# Patient Record
Sex: FEMALE | Race: WHITE | Employment: OTHER | URBAN - METROPOLITAN AREA
[De-identification: names, ages, dates, MRNs, and addresses within clinical notes are randomized per-mention and may not be internally consistent; named-entity substitution may affect disease eponyms.]

---

## 2018-12-17 ENCOUNTER — APPOINTMENT (OUTPATIENT)
Dept: CT IMAGING | Age: 83
End: 2018-12-17
Payer: MEDICARE

## 2018-12-17 ENCOUNTER — HOSPITAL ENCOUNTER (EMERGENCY)
Age: 83
Discharge: HOME | End: 2018-12-17
Attending: EMERGENCY MEDICINE
Payer: MEDICARE

## 2018-12-17 VITALS
DIASTOLIC BLOOD PRESSURE: 52 MMHG | OXYGEN SATURATION: 94 % | SYSTOLIC BLOOD PRESSURE: 122 MMHG | WEIGHT: 123 LBS | BODY MASS INDEX: 21.79 KG/M2 | TEMPERATURE: 97.8 F | RESPIRATION RATE: 17 BRPM | HEART RATE: 72 BPM

## 2018-12-17 DIAGNOSIS — R42 DIZZINESS: Primary | ICD-10-CM

## 2018-12-17 LAB
ALBUMIN SERPL-MCNC: 4.4 G/DL (ref 3.5–5.2)
ALP BLD-CCNC: 40 U/L (ref 35–104)
ALT SERPL-CCNC: 16 U/L (ref 0–32)
ANION GAP SERPL CALCULATED.3IONS-SCNC: 14 MMOL/L (ref 7–16)
AST SERPL-CCNC: 15 U/L (ref 0–31)
BASOPHILS ABSOLUTE: 0.07 E9/L (ref 0–0.2)
BASOPHILS RELATIVE PERCENT: 0.6 % (ref 0–2)
BILIRUB SERPL-MCNC: 0.5 MG/DL (ref 0–1.2)
BUN BLDV-MCNC: 12 MG/DL (ref 8–23)
CALCIUM SERPL-MCNC: 10.6 MG/DL (ref 8.6–10.2)
CHLORIDE BLD-SCNC: 103 MMOL/L (ref 98–107)
CO2: 24 MMOL/L (ref 22–29)
CREAT SERPL-MCNC: 0.7 MG/DL (ref 0.5–1)
EOSINOPHILS ABSOLUTE: 0.11 E9/L (ref 0.05–0.5)
EOSINOPHILS RELATIVE PERCENT: 1 % (ref 0–6)
GFR AFRICAN AMERICAN: >60
GFR NON-AFRICAN AMERICAN: >60 ML/MIN/1.73
GLUCOSE BLD-MCNC: 132 MG/DL (ref 74–99)
HCT VFR BLD CALC: 41.3 % (ref 34–48)
HEMOGLOBIN: 13.8 G/DL (ref 11.5–15.5)
IMMATURE GRANULOCYTES #: 0.05 E9/L
IMMATURE GRANULOCYTES %: 0.4 % (ref 0–5)
LYMPHOCYTES ABSOLUTE: 2.12 E9/L (ref 1.5–4)
LYMPHOCYTES RELATIVE PERCENT: 18.5 % (ref 20–42)
MCH RBC QN AUTO: 30.3 PG (ref 26–35)
MCHC RBC AUTO-ENTMCNC: 33.4 % (ref 32–34.5)
MCV RBC AUTO: 90.6 FL (ref 80–99.9)
MONOCYTES ABSOLUTE: 0.99 E9/L (ref 0.1–0.95)
MONOCYTES RELATIVE PERCENT: 8.6 % (ref 2–12)
NEUTROPHILS ABSOLUTE: 8.11 E9/L (ref 1.8–7.3)
NEUTROPHILS RELATIVE PERCENT: 70.9 % (ref 43–80)
PDW BLD-RTO: 12.7 FL (ref 11.5–15)
PLATELET # BLD: 263 E9/L (ref 130–450)
PMV BLD AUTO: 10.6 FL (ref 7–12)
POTASSIUM SERPL-SCNC: 4.1 MMOL/L (ref 3.5–5)
RBC # BLD: 4.56 E12/L (ref 3.5–5.5)
SODIUM BLD-SCNC: 141 MMOL/L (ref 132–146)
TOTAL PROTEIN: 7.4 G/DL (ref 6.4–8.3)
TROPONIN: <0.01 NG/ML (ref 0–0.03)
WBC # BLD: 11.5 E9/L (ref 4.5–11.5)

## 2018-12-17 PROCEDURE — 99284 EMERGENCY DEPT VISIT MOD MDM: CPT

## 2018-12-17 PROCEDURE — 70450 CT HEAD/BRAIN W/O DYE: CPT

## 2018-12-17 PROCEDURE — 85025 COMPLETE CBC W/AUTO DIFF WBC: CPT

## 2018-12-17 PROCEDURE — 80053 COMPREHEN METABOLIC PANEL: CPT

## 2018-12-17 PROCEDURE — 36415 COLL VENOUS BLD VENIPUNCTURE: CPT

## 2018-12-17 PROCEDURE — 84484 ASSAY OF TROPONIN QUANT: CPT

## 2018-12-17 ASSESSMENT — ENCOUNTER SYMPTOMS
VOMITING: 0
TROUBLE SWALLOWING: 0
BACK PAIN: 0
DIARRHEA: 0
SHORTNESS OF BREATH: 0
EYE REDNESS: 0
SORE THROAT: 0
ABDOMINAL PAIN: 0
NAUSEA: 0
COUGH: 0
BLOOD IN STOOL: 0
VISUAL CHANGE: 0

## 2018-12-17 NOTE — ED NOTES
Patient ambulated with minimal assist. Patient states she feels dizzy while walking. Pulse ox 93-94%.       Ruiz Brady RN  12/17/18 6202

## 2018-12-17 NOTE — ED PROVIDER NOTES
deficit or sensory deficit. Coordination normal. GCS eye subscore is 4. GCS verbal subscore is 5. GCS motor subscore is 6. Skin: Skin is warm and dry. Nursing note and vitals reviewed. Procedures    MDM  Number of Diagnoses or Management Options  Dizziness:   Diagnosis management comments: Patient is a 80-year-old female presents to the emergency department complaining of dizziness. Patient reports she has had multiple similar episodes of this in the past, and it has now resolved. Patient is asymptomatic in the emergency department. She reports that she has prescription for Antivert at home, sometimes she has taken this for her somewhere symptoms in the past as help. However patient is not currently dizzy at all. She moves her head side-to-side with no difficulties. We'll proceed with basic labs, EKG, CT head, ambulate patient, check orthostatics, and reassess. EKG Interpretation. EKG: This EKG is signed and interpreted by me. Rate: 71  Rhythm: Sinus  Interpretation: LAD  Comparison: stable as compared to patient's most recent EKG 08/01/15    Reassess patient and discussed test results. CT evidence of a meningioma that has been present since scans evident of 2015. Patient is aware of this. Patient actually has an appointment with neurology coming up for her intermittent episodes of dizziness. Patient waited in the emergency department without any difficulties. Also provided patient with a referral to ENT. She will follow up with them on an as needed basis. Patient states she feels well and is no longer dizzy. Patient verbalized understanding and agreement to plan of care and discharge instructions.            --------------------------------------------- PAST HISTORY ---------------------------------------------  Past Medical History:  has no past medical history on file. Past Surgical History:  has no past surgical history on file.     Social History:  reports that she has never

## 2018-12-21 LAB
EKG ATRIAL RATE: 71 BPM
EKG P AXIS: 69 DEGREES
EKG P-R INTERVAL: 154 MS
EKG Q-T INTERVAL: 392 MS
EKG QRS DURATION: 76 MS
EKG QTC CALCULATION (BAZETT): 425 MS
EKG R AXIS: -34 DEGREES
EKG T AXIS: 36 DEGREES
EKG VENTRICULAR RATE: 71 BPM

## 2019-01-03 ENCOUNTER — HOSPITAL ENCOUNTER (EMERGENCY)
Age: 84
Discharge: HOME | End: 2019-01-04
Attending: EMERGENCY MEDICINE
Payer: MEDICARE

## 2019-01-03 ENCOUNTER — APPOINTMENT (OUTPATIENT)
Dept: CT IMAGING | Age: 84
End: 2019-01-03
Payer: MEDICARE

## 2019-01-03 ENCOUNTER — APPOINTMENT (OUTPATIENT)
Dept: GENERAL RADIOLOGY | Age: 84
End: 2019-01-03
Payer: MEDICARE

## 2019-01-03 VITALS
DIASTOLIC BLOOD PRESSURE: 68 MMHG | BODY MASS INDEX: 21.26 KG/M2 | WEIGHT: 120 LBS | OXYGEN SATURATION: 94 % | HEART RATE: 67 BPM | HEIGHT: 63 IN | RESPIRATION RATE: 18 BRPM | TEMPERATURE: 97.2 F | SYSTOLIC BLOOD PRESSURE: 127 MMHG

## 2019-01-03 DIAGNOSIS — D32.9 MENINGIOMA (HCC): ICD-10-CM

## 2019-01-03 DIAGNOSIS — R42 DIZZINESS: Primary | ICD-10-CM

## 2019-01-03 DIAGNOSIS — R11.0 NAUSEA: ICD-10-CM

## 2019-01-03 LAB
ALBUMIN SERPL-MCNC: 4.2 G/DL (ref 3.5–5.2)
ALP BLD-CCNC: 44 U/L (ref 35–104)
ALT SERPL-CCNC: 14 U/L (ref 0–32)
ANION GAP SERPL CALCULATED.3IONS-SCNC: 11 MMOL/L (ref 7–16)
AST SERPL-CCNC: 17 U/L (ref 0–31)
BACTERIA: NORMAL /HPF
BASOPHILS ABSOLUTE: 0.07 E9/L (ref 0–0.2)
BASOPHILS RELATIVE PERCENT: 0.6 % (ref 0–2)
BILIRUB SERPL-MCNC: 0.5 MG/DL (ref 0–1.2)
BILIRUBIN URINE: NEGATIVE
BLOOD, URINE: NEGATIVE
BUN BLDV-MCNC: 17 MG/DL (ref 8–23)
CALCIUM SERPL-MCNC: 10.6 MG/DL (ref 8.6–10.2)
CHLORIDE BLD-SCNC: 100 MMOL/L (ref 98–107)
CLARITY: CLEAR
CO2: 29 MMOL/L (ref 22–29)
COLOR: YELLOW
CREAT SERPL-MCNC: 0.7 MG/DL (ref 0.5–1)
CRYSTALS, UA: NORMAL
EKG ATRIAL RATE: 81 BPM
EKG P AXIS: 49 DEGREES
EKG P-R INTERVAL: 162 MS
EKG Q-T INTERVAL: 372 MS
EKG QRS DURATION: 76 MS
EKG QTC CALCULATION (BAZETT): 432 MS
EKG R AXIS: -30 DEGREES
EKG T AXIS: 60 DEGREES
EKG VENTRICULAR RATE: 81 BPM
EOSINOPHILS ABSOLUTE: 0.14 E9/L (ref 0.05–0.5)
EOSINOPHILS RELATIVE PERCENT: 1.2 % (ref 0–6)
GFR AFRICAN AMERICAN: >60
GFR NON-AFRICAN AMERICAN: >60 ML/MIN/1.73
GLUCOSE BLD-MCNC: 158 MG/DL (ref 74–99)
GLUCOSE URINE: NEGATIVE MG/DL
HCT VFR BLD CALC: 40.7 % (ref 34–48)
HEMOGLOBIN: 14 G/DL (ref 11.5–15.5)
IMMATURE GRANULOCYTES #: 0.08 E9/L
IMMATURE GRANULOCYTES %: 0.7 % (ref 0–5)
KETONES, URINE: ABNORMAL MG/DL
LACTIC ACID: 1.7 MMOL/L (ref 0.5–2.2)
LEUKOCYTE ESTERASE, URINE: ABNORMAL
LYMPHOCYTES ABSOLUTE: 2.6 E9/L (ref 1.5–4)
LYMPHOCYTES RELATIVE PERCENT: 22.4 % (ref 20–42)
MCH RBC QN AUTO: 30.9 PG (ref 26–35)
MCHC RBC AUTO-ENTMCNC: 34.4 % (ref 32–34.5)
MCV RBC AUTO: 89.8 FL (ref 80–99.9)
MONOCYTES ABSOLUTE: 1.2 E9/L (ref 0.1–0.95)
MONOCYTES RELATIVE PERCENT: 10.3 % (ref 2–12)
NEUTROPHILS ABSOLUTE: 7.52 E9/L (ref 1.8–7.3)
NEUTROPHILS RELATIVE PERCENT: 64.8 % (ref 43–80)
NITRITE, URINE: NEGATIVE
PDW BLD-RTO: 12.5 FL (ref 11.5–15)
PH UA: 6.5 (ref 5–9)
PLATELET # BLD: 277 E9/L (ref 130–450)
PMV BLD AUTO: 10.2 FL (ref 7–12)
POTASSIUM SERPL-SCNC: 4.3 MMOL/L (ref 3.5–5)
PROTEIN UA: NEGATIVE MG/DL
RBC # BLD: 4.53 E12/L (ref 3.5–5.5)
RBC UA: NORMAL /HPF (ref 0–2)
SODIUM BLD-SCNC: 140 MMOL/L (ref 132–146)
SPECIFIC GRAVITY UA: 1.01 (ref 1–1.03)
TOTAL PROTEIN: 7.4 G/DL (ref 6.4–8.3)
TROPONIN: <0.01 NG/ML (ref 0–0.03)
UROBILINOGEN, URINE: 0.2 E.U./DL
WBC # BLD: 11.6 E9/L (ref 4.5–11.5)
WBC UA: NORMAL /HPF (ref 0–5)

## 2019-01-03 PROCEDURE — 6360000002 HC RX W HCPCS: Performed by: EMERGENCY MEDICINE

## 2019-01-03 PROCEDURE — 84484 ASSAY OF TROPONIN QUANT: CPT

## 2019-01-03 PROCEDURE — 85025 COMPLETE CBC W/AUTO DIFF WBC: CPT

## 2019-01-03 PROCEDURE — 80053 COMPREHEN METABOLIC PANEL: CPT

## 2019-01-03 PROCEDURE — 81001 URINALYSIS AUTO W/SCOPE: CPT

## 2019-01-03 PROCEDURE — 83605 ASSAY OF LACTIC ACID: CPT

## 2019-01-03 PROCEDURE — 71046 X-RAY EXAM CHEST 2 VIEWS: CPT

## 2019-01-03 PROCEDURE — 93005 ELECTROCARDIOGRAM TRACING: CPT | Performed by: PHYSICIAN ASSISTANT

## 2019-01-03 PROCEDURE — 70450 CT HEAD/BRAIN W/O DYE: CPT

## 2019-01-03 PROCEDURE — 99284 EMERGENCY DEPT VISIT MOD MDM: CPT

## 2019-01-03 PROCEDURE — 36415 COLL VENOUS BLD VENIPUNCTURE: CPT

## 2019-01-03 RX ORDER — ONDANSETRON 2 MG/ML
4 INJECTION INTRAMUSCULAR; INTRAVENOUS ONCE
Status: DISCONTINUED | OUTPATIENT
Start: 2019-01-03 | End: 2019-01-03

## 2019-01-03 RX ORDER — ONDANSETRON 4 MG/1
4 TABLET, ORALLY DISINTEGRATING ORAL ONCE
Status: COMPLETED | OUTPATIENT
Start: 2019-01-03 | End: 2019-01-03

## 2019-01-03 RX ADMIN — ONDANSETRON 4 MG: 4 TABLET, ORALLY DISINTEGRATING ORAL at 23:48

## 2019-01-04 RX ORDER — ONDANSETRON 4 MG/1
4 TABLET, ORALLY DISINTEGRATING ORAL EVERY 8 HOURS PRN
Qty: 9 TABLET | Refills: 0 | Status: SHIPPED | OUTPATIENT
Start: 2019-01-04 | End: 2019-01-07

## 2019-05-11 ENCOUNTER — HOSPITAL ENCOUNTER (EMERGENCY)
Age: 84
Discharge: HOME | End: 2019-05-11
Attending: EMERGENCY MEDICINE
Payer: MEDICARE

## 2019-05-11 ENCOUNTER — APPOINTMENT (OUTPATIENT)
Dept: GENERAL RADIOLOGY | Age: 84
End: 2019-05-11
Payer: MEDICARE

## 2019-05-11 ENCOUNTER — APPOINTMENT (OUTPATIENT)
Dept: CT IMAGING | Age: 84
End: 2019-05-11
Payer: MEDICARE

## 2019-05-11 VITALS
HEART RATE: 96 BPM | HEIGHT: 63 IN | DIASTOLIC BLOOD PRESSURE: 75 MMHG | OXYGEN SATURATION: 96 % | RESPIRATION RATE: 20 BRPM | TEMPERATURE: 97.1 F | WEIGHT: 121 LBS | SYSTOLIC BLOOD PRESSURE: 149 MMHG | BODY MASS INDEX: 21.44 KG/M2

## 2019-05-11 DIAGNOSIS — F41.1 ANXIETY STATE: Primary | ICD-10-CM

## 2019-05-11 LAB
ALBUMIN SERPL-MCNC: 4.4 G/DL (ref 3.5–5.2)
ALP BLD-CCNC: 49 U/L (ref 35–104)
ALT SERPL-CCNC: 17 U/L (ref 0–32)
ANION GAP SERPL CALCULATED.3IONS-SCNC: 11 MMOL/L (ref 7–16)
AST SERPL-CCNC: 19 U/L (ref 0–31)
BETA-HYDROXYBUTYRATE: 0.18 MMOL/L (ref 0.02–0.27)
BILIRUB SERPL-MCNC: 0.6 MG/DL (ref 0–1.2)
BILIRUBIN URINE: NEGATIVE
BLOOD, URINE: NEGATIVE
BUN BLDV-MCNC: 8 MG/DL (ref 8–23)
CALCIUM SERPL-MCNC: 9.6 MG/DL (ref 8.6–10.2)
CHLORIDE BLD-SCNC: 104 MMOL/L (ref 98–107)
CLARITY: CLEAR
CO2: 26 MMOL/L (ref 22–29)
COLOR: YELLOW
CREAT SERPL-MCNC: 0.6 MG/DL (ref 0.5–1)
GFR AFRICAN AMERICAN: >60
GFR NON-AFRICAN AMERICAN: >60 ML/MIN/1.73
GLUCOSE BLD-MCNC: 157 MG/DL (ref 74–99)
GLUCOSE URINE: NEGATIVE MG/DL
HCT VFR BLD CALC: 38.1 % (ref 34–48)
HEMOGLOBIN: 13.4 G/DL (ref 11.5–15.5)
KETONES, URINE: NEGATIVE MG/DL
LACTIC ACID: 3.1 MMOL/L (ref 0.5–2.2)
LEUKOCYTE ESTERASE, URINE: NEGATIVE
MCH RBC QN AUTO: 30.9 PG (ref 26–35)
MCHC RBC AUTO-ENTMCNC: 35.2 % (ref 32–34.5)
MCV RBC AUTO: 87.8 FL (ref 80–99.9)
METER GLUCOSE: 246 MG/DL (ref 74–99)
NITRITE, URINE: NEGATIVE
PDW BLD-RTO: 14 FL (ref 11.5–15)
PH UA: 5.5 (ref 5–9)
PH VENOUS: 7.37 (ref 7.3–7.42)
PLATELET # BLD: 279 E9/L (ref 130–450)
PMV BLD AUTO: 10.8 FL (ref 7–12)
POTASSIUM SERPL-SCNC: 3.7 MMOL/L (ref 3.5–5)
PROTEIN UA: NEGATIVE MG/DL
RBC # BLD: 4.34 E12/L (ref 3.5–5.5)
REASON FOR REJECTION: NORMAL
REJECTED TEST: NORMAL
SODIUM BLD-SCNC: 141 MMOL/L (ref 132–146)
SPECIFIC GRAVITY UA: <=1.005 (ref 1–1.03)
TOTAL PROTEIN: 7.3 G/DL (ref 6.4–8.3)
TROPONIN: <0.01 NG/ML (ref 0–0.03)
TSH SERPL DL<=0.05 MIU/L-ACNC: 2.66 UIU/ML (ref 0.27–4.2)
UROBILINOGEN, URINE: 0.2 E.U./DL
WBC # BLD: 11.4 E9/L (ref 4.5–11.5)

## 2019-05-11 PROCEDURE — 99284 EMERGENCY DEPT VISIT MOD MDM: CPT

## 2019-05-11 PROCEDURE — 81003 URINALYSIS AUTO W/O SCOPE: CPT

## 2019-05-11 PROCEDURE — 84484 ASSAY OF TROPONIN QUANT: CPT

## 2019-05-11 PROCEDURE — 83605 ASSAY OF LACTIC ACID: CPT

## 2019-05-11 PROCEDURE — 82800 BLOOD PH: CPT

## 2019-05-11 PROCEDURE — 82010 KETONE BODYS QUAN: CPT

## 2019-05-11 PROCEDURE — 93005 ELECTROCARDIOGRAM TRACING: CPT | Performed by: EMERGENCY MEDICINE

## 2019-05-11 PROCEDURE — 70450 CT HEAD/BRAIN W/O DYE: CPT

## 2019-05-11 PROCEDURE — 84443 ASSAY THYROID STIM HORMONE: CPT

## 2019-05-11 PROCEDURE — 36415 COLL VENOUS BLD VENIPUNCTURE: CPT

## 2019-05-11 PROCEDURE — 80053 COMPREHEN METABOLIC PANEL: CPT

## 2019-05-11 PROCEDURE — 2580000003 HC RX 258: Performed by: NURSE PRACTITIONER

## 2019-05-11 PROCEDURE — 96374 THER/PROPH/DIAG INJ IV PUSH: CPT

## 2019-05-11 PROCEDURE — 6360000002 HC RX W HCPCS: Performed by: NURSE PRACTITIONER

## 2019-05-11 PROCEDURE — 82962 GLUCOSE BLOOD TEST: CPT

## 2019-05-11 PROCEDURE — 85027 COMPLETE CBC AUTOMATED: CPT

## 2019-05-11 PROCEDURE — 71046 X-RAY EXAM CHEST 2 VIEWS: CPT

## 2019-05-11 RX ORDER — CALCIUM POLYCARBOPHIL 625 MG 625 MG/1
625 TABLET ORAL DAILY
COMMUNITY

## 2019-05-11 RX ORDER — ESCITALOPRAM OXALATE 20 MG/1
20 TABLET ORAL DAILY
Refills: 1 | Status: ON HOLD | COMMUNITY
Start: 2019-05-09 | End: 2020-04-27 | Stop reason: HOSPADM

## 2019-05-11 RX ORDER — VILAZODONE HYDROCHLORIDE 40 MG/1
40 TABLET ORAL DAILY
Refills: 0 | Status: ON HOLD | COMMUNITY
Start: 2019-05-07 | End: 2020-04-25

## 2019-05-11 RX ORDER — BUSPIRONE HYDROCHLORIDE 10 MG/1
10 TABLET ORAL DAILY
Refills: 0 | Status: ON HOLD | COMMUNITY
Start: 2019-04-18 | End: 2020-04-25

## 2019-05-11 RX ORDER — MULTIVIT-MIN/FA/LYCOPEN/LUTEIN .4-300-25
1 TABLET ORAL DAILY
Status: ON HOLD | COMMUNITY
End: 2020-12-28

## 2019-05-11 RX ORDER — MIRTAZAPINE 15 MG/1
15 TABLET, FILM COATED ORAL DAILY
Refills: 0 | Status: ON HOLD | COMMUNITY
Start: 2019-04-28 | End: 2020-04-27 | Stop reason: HOSPADM

## 2019-05-11 RX ORDER — 0.9 % SODIUM CHLORIDE 0.9 %
500 INTRAVENOUS SOLUTION INTRAVENOUS ONCE
Status: COMPLETED | OUTPATIENT
Start: 2019-05-11 | End: 2019-05-11

## 2019-05-11 RX ORDER — LORAZEPAM 2 MG/ML
0.5 INJECTION INTRAMUSCULAR ONCE
Status: COMPLETED | OUTPATIENT
Start: 2019-05-11 | End: 2019-05-11

## 2019-05-11 RX ADMIN — SODIUM CHLORIDE 500 ML: 9 INJECTION, SOLUTION INTRAVENOUS at 11:42

## 2019-05-11 RX ADMIN — LORAZEPAM 0.5 MG: 2 INJECTION INTRAMUSCULAR; INTRAVENOUS at 11:43

## 2019-05-11 NOTE — ED PROVIDER NOTES
ED Attending  CC: Zuleyka         Department of Emergency Medicine   ED  Provider Note  Admit Date/RoomTime: 5/11/2019 11:12 AM  ED Room: 01/01  MRN: [id number]  Chief Complaint: Anxiety (states after she took her Buspar which is not new \"my skin feels like its jumping off\")       History of Present Illness   Source of history provided by:  patient. History/Exam Limitations: mental status. Indigo Cerrato is a 80 y.o. female who has a past medical history of:   Past Medical History:   Diagnosis Date    Diabetes mellitus ([address]     Hyperlipidemia     Hypertension     Thyroid disease     presents to the ED by EMS for complaint of possible anxiety. She reports that she feels like her skin is jumping off her body. She states that she has this occurrence often after taking her Buspar. She is somewhat of a poor historian when questioning her visits with her doctor and medications. She reports that she lives in her own home but has care givers who assist her. Denies chest pain, shortness of breath, palpitations, abdominal pain, nausea, vomiting, diarrhea, headache, dizziness, lightheadedness, syncope, extremity weakness, calf pain, dysuria, or urinary frequency. ROS    Pertinent positives and negatives are stated within HPI, all other systems reviewed and are negative. Past Surgical History:   Procedure Laterality Date    DILATION AND CURETTAGE OF UTERUS      THYROIDECTOMY     Social History:  reports that she has never smoked. She has never used smokeless tobacco. She reports that she does not drink alcohol or use drugs. Family History: family history is not on file. Allergies: Patient has no known allergies.     Physical Exam   Oxygen Saturation Interpretation: Normal.   ED Triage Vitals [05/11/19 1112]   BP Temp Temp Source Pulse Resp SpO2 Height Weight   (!) 148/81 97.1 °F (36.2 °C) Temporal 99 16 96 % 5' 3\" (1.6 m) 121 lb (54.9 kg)     Physical Exam  · Constitutional/General: Alert and oriented x2, well appearing, non toxic  · HEENT:  NC/NT. PERRLA,  Airway patent. · Neck: Supple, full ROM, non tender to palpation in the midline, no stridor, no crepitus, no meningeal signs  · Respiratory: Lungs clear to auscultation bilaterally, no wheezes, rales, or rhonchi. Not in respiratory distress  · CV:  Regular rate. Regular rhythm. + systolic murmur. 2+ distal pulses  · Chest: No chest wall tenderness  · GI:  Abdomen Soft, Non tender, Non distended. +BS. No rebound, guarding, or rigidity. No pulsatile masses. · Musculoskeletal: Moves all extremities x 4. Warm and well perfused, no clubbing, cyanosis, or edema. Capillary refill <3 seconds  · Integument: skin warm and dry. No rashes.    · Lymphatic: no lymphadenopathy noted  · Neurologic: GCS 14, no focal deficits, symmetric strength 5/5 in the upper and lower extremities bilaterally  · Psychiatric: Normal Affect    Lab / Imaging Results   (All laboratory and radiology results have been personally reviewed by myself)  Labs:  Results for orders placed or performed during the hospital encounter of 05/11/19   CBC   Result Value Ref Range    WBC 11.4 4.5 - 11.5 E9/L    RBC 4.34 3.50 - 5.50 E12/L    Hemoglobin 13.4 11.5 - 15.5 g/dL    Hematocrit 38.1 34.0 - 48.0 %    MCV 87.8 80.0 - 99.9 fL    MCH 30.9 26.0 - 35.0 pg    MCHC 35.2 (H) 32.0 - 34.5 %    RDW 14.0 11.5 - 15.0 fL    Platelets 839 907 - 234 E9/L    MPV 10.8 7.0 - 12.0 fL   TSH without Reflex   Result Value Ref Range    TSH 2.660 0.270 - 4.200 uIU/mL   Urinalysis   Result Value Ref Range    Color, UA Yellow Straw/Yellow    Clarity, UA Clear Clear    Glucose, Ur Negative Negative mg/dL    Bilirubin Urine Negative Negative    Ketones, Urine Negative Negative mg/dL    Specific Gravity, UA <=1.005 1.005 - 1.030    Blood, Urine Negative Negative    pH, UA 5.5 5.0 - 9.0    Protein, UA Negative Negative mg/dL    Urobilinogen, Urine 0.2 <2.0 E.U./dL    Nitrite, Urine Negative Negative    Leukocyte Esterase, Urine Negative Negative   Lactic Acid, Plasma   Result Value Ref Range    Lactic Acid 3.1 (H) 0.5 - 2.2 mmol/L   pH, venous   Result Value Ref Range    pH, Jacinto 7.37 7.30 - 7.42   Beta-Hydroxybutyrate   Result Value Ref Range    Beta-Hydroxybutyrate 0.18 0.02 - 0.27 mmol/L   SPECIMEN REJECTION   Result Value Ref Range    Rejected Test CMP     Reason for Rejection see below    Comprehensive Metabolic Panel   Result Value Ref Range    Sodium 141 132 - 146 mmol/L    Potassium 3.7 3.5 - 5.0 mmol/L    Chloride 104 98 - 107 mmol/L    CO2 26 22 - 29 mmol/L    Anion Gap 11 7 - 16 mmol/L    Glucose 157 (H) 74 - 99 mg/dL    BUN 8 8 - 23 mg/dL    CREATININE 0.6 0.5 - 1.0 mg/dL    GFR Non-African American >60 >=60 mL/min/1.73    GFR African American >60     Calcium 9.6 8.6 - 10.2 mg/dL    Total Protein 7.3 6.4 - 8.3 g/dL    Alb 4.4 3.5 - 5.2 g/dL    Total Bilirubin 0.6 0.0 - 1.2 mg/dL    Alkaline Phosphatase 49 35 - 104 U/L    ALT 17 0 - 32 U/L    AST 19 0 - 31 U/L   Troponin   Result Value Ref Range    Troponin <0.01 0.00 - 0.03 ng/mL   POCT Glucose   Result Value Ref Range    Meter Glucose 246 (H) 74 - 99 mg/dL   EKG 12 Lead   Result Value Ref Range    Ventricular Rate 91 BPM    Atrial Rate 91 BPM    P-R Interval 162 ms    QRS Duration 74 ms    Q-T Interval 364 ms    QTc Calculation (Bazett) 447 ms    P Axis 66 degrees    R Axis -18 degrees    T Axis 71 degrees     Imaging: All Radiology results interpreted by Radiologist unless otherwise noted. XR CHEST STANDARD (2 VW)   Final Result   Indeterminate right basilar airspace disease could be   reflective of atelectasis/scarring and/or an early developing   infiltrate/pneumonia. CT Head WO Contrast   Final Result   1. No CT evidence of acute intracranial abnormality, as questioned.  If   there is clinical concern for potential underlying acute   cerebrovascular infarction/accident or other potential abnormalities   of underlying brain parenchyma, MRI of the brain would be recommended for more detailed evaluation. .   2. Mild to moderate cerebral volume loss/atrophy with white matter   changes suggestive of sequelae small vessel ischemic disease. 3. Vascular calcifications within the bilateral internal carotid   artery cavernous segments. .   4. Partially empty sella. ED Course / Medical Decision Making     Medications   0.9 % sodium chloride bolus (0 mLs Intravenous Stopped 5/11/19 1310)   LORazepam (ATIVAN) injection 0.5 mg (0.5 mg Intravenous Given 5/11/19 1143)         EKG #1:  Interpreted by emergency department physician unless otherwise noted. Time:  1118    Rate: 91  Rhythm: Sinus. Interpretation: normal sinus rhythm, PAC's noted. EKG reviewed with Dr Jaiden Cervantes  Re-examination:  5/11/19       Time: 1230 Pt reports significant improvement       Consult(s):   None    Procedure(s):   none    MDM:   Kristen Caceres is a 24-year-old female who presents to the emergency department via EMS for complaint of tingling like her skin is jumping off her body. She states that she has had similar symptoms in the past and she associates it with taking her BuSpar. She reports being under the care of Dr Merle Lynne for psychiatry. She is oriented to person, place, but confused on time factor. CBC no leukocytosis with a stable H&H of 13.4/38.1 TSH 2.66, beta hydroxybutyrate 0.18, CT of the head unremarkable for any acute process. CXR negative. Troponin < 0.01. Lactic acid 3.1 and no concerns for infectious process. She was given IV fluids while in the ED in addition to Ativan. Results were discussed with the pt and sister and instructed to follow up with Dr Thomas Leon and Dr Merle Lynne for medication adjustments. Advised to return to the ED for any new, changing, worsening symptoms or concerns. Counseling:     The emergency provider has spoken with the patient and and sister and discussed todays results, in addition to providing specific details for the plan of care and counseling regarding the diagnosis and prognosis. Questions are answered at this time and they are agreeable with the plan. Assessment      1. Anxiety state      Plan   Discharge to home  Patient condition is good    New Medications     Discharge Medication List as of 5/11/2019  3:11 PM        Electronically signed by TRISH Jain CNP   DD: 5/11/19  **This report was transcribed using voice recognition software. Every effort was made to ensure accuracy; however, inadvertent computerized transcription errors may be present.   END OF ED PROVIDER NOTE     TRISH Jain CNP  05/12/19 2171

## 2019-05-12 ENCOUNTER — HOSPITAL ENCOUNTER (EMERGENCY)
Age: 84
Discharge: HOME | End: 2019-05-12
Attending: EMERGENCY MEDICINE
Payer: MEDICARE

## 2019-05-12 VITALS
SYSTOLIC BLOOD PRESSURE: 140 MMHG | BODY MASS INDEX: 21.44 KG/M2 | HEART RATE: 75 BPM | OXYGEN SATURATION: 98 % | TEMPERATURE: 97.3 F | DIASTOLIC BLOOD PRESSURE: 62 MMHG | HEIGHT: 63 IN | WEIGHT: 121 LBS | RESPIRATION RATE: 16 BRPM

## 2019-05-12 DIAGNOSIS — F41.9 ANXIETY: Primary | ICD-10-CM

## 2019-05-12 LAB
ACETAMINOPHEN LEVEL: <5 MCG/ML (ref 10–30)
AMPHETAMINE SCREEN, URINE: NOT DETECTED
BARBITURATE SCREEN URINE: NOT DETECTED
BENZODIAZEPINE SCREEN, URINE: NOT DETECTED
CANNABINOID SCREEN URINE: NOT DETECTED
COCAINE METABOLITE SCREEN URINE: NOT DETECTED
EKG ATRIAL RATE: 91 BPM
EKG P AXIS: 66 DEGREES
EKG P-R INTERVAL: 162 MS
EKG Q-T INTERVAL: 364 MS
EKG QRS DURATION: 74 MS
EKG QTC CALCULATION (BAZETT): 447 MS
EKG R AXIS: -18 DEGREES
EKG T AXIS: 71 DEGREES
EKG VENTRICULAR RATE: 91 BPM
ETHANOL: <10 MG/DL (ref 0–0.08)
METHADONE SCREEN, URINE: NOT DETECTED
OPIATE SCREEN URINE: NOT DETECTED
PHENCYCLIDINE SCREEN URINE: NOT DETECTED
PROPOXYPHENE SCREEN: NOT DETECTED
SALICYLATE, SERUM: <0.3 MG/DL (ref 0–30)
TRICYCLIC ANTIDEPRESSANTS SCREEN SERUM: NEGATIVE NG/ML

## 2019-05-12 PROCEDURE — 93010 ELECTROCARDIOGRAM REPORT: CPT | Performed by: INTERNAL MEDICINE

## 2019-05-12 PROCEDURE — 80307 DRUG TEST PRSMV CHEM ANLYZR: CPT

## 2019-05-12 PROCEDURE — G0480 DRUG TEST DEF 1-7 CLASSES: HCPCS

## 2019-05-12 PROCEDURE — 36415 COLL VENOUS BLD VENIPUNCTURE: CPT

## 2019-05-12 PROCEDURE — 99284 EMERGENCY DEPT VISIT MOD MDM: CPT

## 2019-05-12 NOTE — ED NOTES
The pt is a 80year old white female who was brought in by EMS- accompanied by her sister due to anxiety where the cl stated that her skin was crawling and thoughts were racing. She reportedly saw her doctor - Dr. Merle Lynne several weeks ago and he took her off of remeron and put her on Vibrid after she had a DNA test.  The sister stated that things have been ok until this week. She stated that several months ago she was abusing klonopin and she was taken off of it. Yesterday when she came to the ER with anxiety she was given a dose of ativan and sent with a script. She stated that she took one ativan yesterday and did fine. Today she took one early AM and the afternoon and did not get better so she called 911. The sister denies any recent stressors. The pt denies past or present SI, HI, AVH, AOD, and in pt psych stays. The pt was ok with the plan to call Dr. Qing Solano service to see what her doctor would like to do. The ER doctor is considering Vistiril- however the pt's sister reported that the pt had a bad reaction to benadryl in the past where she slept a lot. Pt completed the SBIRT and CSSRS.      [facility]  05/12/19 0318

## 2019-05-12 NOTE — ED NOTES
Bed: 17A-17  Expected date:   Expected time:   Means of arrival:   Comments:  obinna Oseguera RN  05/12/19 3377

## 2019-05-12 NOTE — ED NOTES
No change in pts condition, sitting upright in bed talking with caregiver at bed side, no distress noted, no complaints offered, awaiting SW to evaluate      Rolly Romero RN  05/12/19 3443

## 2019-05-15 NOTE — ED PROVIDER NOTES
Course / Medical Decision Making   Medications - No data to display  Consult(s):   IP CONSULT TO SOCIAL WORK    Procedure(s):   none    MDM:   Patient in no acute distress. Vital signs stable. Dr. Quiana Lawson evaluated the patient. Social work consulted. Results from yesterday were reviewed. Drug screens added. Patient will follow with PCP and psychiatrist. Educated on the signs and symptoms to return to the ED. Discharged in stable condition. Counseling: The emergency provider has spoken with the patient and discussed todays results, in addition to providing specific details for the plan of care and counseling regarding the diagnosis and prognosis. Questions are answered at this time and they are agreeable with the plan. Assessment      1. Anxiety      Plan   Discharge to home  Patient condition is good    New Medications     Discharge Medication List as of 5/12/2019  4:37 PM        Electronically signed by MOSES Stauffer   DD: 5/15/19  **This report was transcribed using voice recognition software. Every effort was made to ensure accuracy; however, inadvertent computerized transcription errors may be present.   END OF ED PROVIDER NOTE       Shaneka Stauffer  05/15/19 9178

## 2020-04-25 ENCOUNTER — APPOINTMENT (OUTPATIENT)
Dept: CT IMAGING | Age: 85
DRG: 690 | End: 2020-04-25
Payer: MEDICARE

## 2020-04-25 ENCOUNTER — HOSPITAL ENCOUNTER (INPATIENT)
Age: 85
LOS: 2 days | Discharge: HOME HEALTH | DRG: 690 | End: 2020-04-27
Attending: EMERGENCY MEDICINE | Admitting: INTERNAL MEDICINE
Payer: MEDICARE

## 2020-04-25 PROBLEM — N39.0 COMPLICATED UTI (URINARY TRACT INFECTION): Status: ACTIVE | Noted: 2020-04-25

## 2020-04-25 PROBLEM — N39.0 UTI (URINARY TRACT INFECTION): Status: ACTIVE | Noted: 2020-04-25

## 2020-04-25 LAB
ALBUMIN SERPL-MCNC: 4.7 G/DL (ref 3.5–5.2)
ALP BLD-CCNC: 48 U/L (ref 35–104)
ALT SERPL-CCNC: 31 U/L (ref 0–32)
ANION GAP SERPL CALCULATED.3IONS-SCNC: 19 MMOL/L (ref 7–16)
AST SERPL-CCNC: 29 U/L (ref 0–31)
BACTERIA: ABNORMAL /HPF
BASOPHILS ABSOLUTE: 0.07 E9/L (ref 0–0.2)
BASOPHILS RELATIVE PERCENT: 0.4 % (ref 0–2)
BILIRUB SERPL-MCNC: 0.5 MG/DL (ref 0–1.2)
BILIRUBIN URINE: NEGATIVE
BLOOD, URINE: ABNORMAL
BUN BLDV-MCNC: 11 MG/DL (ref 8–23)
BURR CELLS: ABNORMAL
CALCIUM SERPL-MCNC: 10.9 MG/DL (ref 8.6–10.2)
CHLORIDE BLD-SCNC: 99 MMOL/L (ref 98–107)
CLARITY: CLEAR
CO2: 22 MMOL/L (ref 22–29)
COLOR: YELLOW
CREAT SERPL-MCNC: 0.7 MG/DL (ref 0.5–1)
EOSINOPHILS ABSOLUTE: 0.07 E9/L (ref 0.05–0.5)
EOSINOPHILS RELATIVE PERCENT: 0.4 % (ref 0–6)
GFR AFRICAN AMERICAN: >60
GFR NON-AFRICAN AMERICAN: >60 ML/MIN/1.73
GLUCOSE BLD-MCNC: 252 MG/DL (ref 74–99)
GLUCOSE URINE: 500 MG/DL
HBA1C MFR BLD: 8 % (ref 4–5.6)
HCT VFR BLD CALC: 41.6 % (ref 34–48)
HEMOGLOBIN: 13.7 G/DL (ref 11.5–15.5)
IMMATURE GRANULOCYTES #: 0.13 E9/L
IMMATURE GRANULOCYTES %: 0.7 % (ref 0–5)
KETONES, URINE: NEGATIVE MG/DL
LACTIC ACID: 4.1 MMOL/L (ref 0.5–2.2)
LEUKOCYTE ESTERASE, URINE: ABNORMAL
LYMPHOCYTES ABSOLUTE: 2.68 E9/L (ref 1.5–4)
LYMPHOCYTES RELATIVE PERCENT: 13.6 % (ref 20–42)
MCH RBC QN AUTO: 29.2 PG (ref 26–35)
MCHC RBC AUTO-ENTMCNC: 32.9 % (ref 32–34.5)
MCV RBC AUTO: 88.7 FL (ref 80–99.9)
METER GLUCOSE: 229 MG/DL (ref 74–99)
MONOCYTES ABSOLUTE: 1.54 E9/L (ref 0.1–0.95)
MONOCYTES RELATIVE PERCENT: 7.8 % (ref 2–12)
NEUTROPHILS ABSOLUTE: 15.19 E9/L (ref 1.8–7.3)
NEUTROPHILS RELATIVE PERCENT: 77.1 % (ref 43–80)
NITRITE, URINE: NEGATIVE
PDW BLD-RTO: 13.4 FL (ref 11.5–15)
PH UA: 6 (ref 5–9)
PLATELET # BLD: 319 E9/L (ref 130–450)
PMV BLD AUTO: 11 FL (ref 7–12)
POIKILOCYTES: ABNORMAL
POTASSIUM REFLEX MAGNESIUM: 4.4 MMOL/L (ref 3.5–5)
PROTEIN UA: NEGATIVE MG/DL
RBC # BLD: 4.69 E12/L (ref 3.5–5.5)
RBC UA: ABNORMAL /HPF (ref 0–2)
SODIUM BLD-SCNC: 140 MMOL/L (ref 132–146)
SPECIFIC GRAVITY UA: 1.02 (ref 1–1.03)
TOTAL PROTEIN: 7.8 G/DL (ref 6.4–8.3)
UROBILINOGEN, URINE: 0.2 E.U./DL
WBC # BLD: 19.7 E9/L (ref 4.5–11.5)
WBC UA: ABNORMAL /HPF (ref 0–5)

## 2020-04-25 PROCEDURE — 1200000000 HC SEMI PRIVATE

## 2020-04-25 PROCEDURE — 80053 COMPREHEN METABOLIC PANEL: CPT

## 2020-04-25 PROCEDURE — 83036 HEMOGLOBIN GLYCOSYLATED A1C: CPT

## 2020-04-25 PROCEDURE — 83605 ASSAY OF LACTIC ACID: CPT

## 2020-04-25 PROCEDURE — 96365 THER/PROPH/DIAG IV INF INIT: CPT

## 2020-04-25 PROCEDURE — 99285 EMERGENCY DEPT VISIT HI MDM: CPT

## 2020-04-25 PROCEDURE — 81001 URINALYSIS AUTO W/SCOPE: CPT

## 2020-04-25 PROCEDURE — 82962 GLUCOSE BLOOD TEST: CPT

## 2020-04-25 PROCEDURE — 74177 CT ABD & PELVIS W/CONTRAST: CPT

## 2020-04-25 PROCEDURE — 36415 COLL VENOUS BLD VENIPUNCTURE: CPT

## 2020-04-25 PROCEDURE — 6360000004 HC RX CONTRAST MEDICATION: Performed by: RADIOLOGY

## 2020-04-25 PROCEDURE — 87040 BLOOD CULTURE FOR BACTERIA: CPT

## 2020-04-25 PROCEDURE — 85025 COMPLETE CBC W/AUTO DIFF WBC: CPT

## 2020-04-25 PROCEDURE — 87088 URINE BACTERIA CULTURE: CPT

## 2020-04-25 PROCEDURE — 2580000003 HC RX 258: Performed by: INTERNAL MEDICINE

## 2020-04-25 PROCEDURE — 6360000002 HC RX W HCPCS: Performed by: NURSE PRACTITIONER

## 2020-04-25 PROCEDURE — G0378 HOSPITAL OBSERVATION PER HR: HCPCS

## 2020-04-25 PROCEDURE — 6360000002 HC RX W HCPCS: Performed by: INTERNAL MEDICINE

## 2020-04-25 PROCEDURE — 96366 THER/PROPH/DIAG IV INF ADDON: CPT

## 2020-04-25 PROCEDURE — 2580000003 HC RX 258: Performed by: NURSE PRACTITIONER

## 2020-04-25 RX ORDER — NICOTINE POLACRILEX 4 MG
15 LOZENGE BUCCAL PRN
Status: DISCONTINUED | OUTPATIENT
Start: 2020-04-25 | End: 2020-04-27 | Stop reason: HOSPADM

## 2020-04-25 RX ORDER — ASPIRIN 81 MG/1
81 TABLET ORAL DAILY
Status: DISCONTINUED | OUTPATIENT
Start: 2020-04-26 | End: 2020-04-27 | Stop reason: HOSPADM

## 2020-04-25 RX ORDER — ATORVASTATIN CALCIUM 20 MG/1
20 TABLET, FILM COATED ORAL ONCE
Status: DISCONTINUED | OUTPATIENT
Start: 2020-04-25 | End: 2020-04-27 | Stop reason: HOSPADM

## 2020-04-25 RX ORDER — 0.9 % SODIUM CHLORIDE 0.9 %
1000 INTRAVENOUS SOLUTION INTRAVENOUS ONCE
Status: COMPLETED | OUTPATIENT
Start: 2020-04-25 | End: 2020-04-25

## 2020-04-25 RX ORDER — SODIUM CHLORIDE 0.9 % (FLUSH) 0.9 %
10 SYRINGE (ML) INJECTION PRN
Status: DISCONTINUED | OUTPATIENT
Start: 2020-04-25 | End: 2020-04-27 | Stop reason: HOSPADM

## 2020-04-25 RX ORDER — ALOGLIPTIN 12.5 MG/1
12.5 TABLET, FILM COATED ORAL DAILY
Status: DISCONTINUED | OUTPATIENT
Start: 2020-04-26 | End: 2020-04-27 | Stop reason: HOSPADM

## 2020-04-25 RX ORDER — ESCITALOPRAM OXALATE 10 MG/1
20 TABLET ORAL DAILY
Status: DISCONTINUED | OUTPATIENT
Start: 2020-04-26 | End: 2020-04-27 | Stop reason: HOSPADM

## 2020-04-25 RX ORDER — CALCIUM POLYCARBOPHIL 625 MG 625 MG/1
625 TABLET ORAL DAILY
Status: DISCONTINUED | OUTPATIENT
Start: 2020-04-26 | End: 2020-04-27 | Stop reason: HOSPADM

## 2020-04-25 RX ORDER — ONDANSETRON 2 MG/ML
4 INJECTION INTRAMUSCULAR; INTRAVENOUS EVERY 6 HOURS PRN
Status: DISCONTINUED | OUTPATIENT
Start: 2020-04-25 | End: 2020-04-27 | Stop reason: HOSPADM

## 2020-04-25 RX ORDER — ACETAMINOPHEN 325 MG/1
650 TABLET ORAL EVERY 6 HOURS PRN
Status: DISCONTINUED | OUTPATIENT
Start: 2020-04-25 | End: 2020-04-27 | Stop reason: HOSPADM

## 2020-04-25 RX ORDER — LEVOTHYROXINE SODIUM 0.03 MG/1
25 TABLET ORAL DAILY
Status: DISCONTINUED | OUTPATIENT
Start: 2020-04-26 | End: 2020-04-27 | Stop reason: HOSPADM

## 2020-04-25 RX ORDER — VILAZODONE HYDROCHLORIDE 40 MG/1
40 TABLET ORAL DAILY
Status: DISCONTINUED | OUTPATIENT
Start: 2020-04-26 | End: 2020-04-26

## 2020-04-25 RX ORDER — UBIDECARENONE 75 MG
100 CAPSULE ORAL DAILY
Status: DISCONTINUED | OUTPATIENT
Start: 2020-04-26 | End: 2020-04-27 | Stop reason: HOSPADM

## 2020-04-25 RX ORDER — MORPHINE SULFATE 2 MG/ML
2 INJECTION, SOLUTION INTRAMUSCULAR; INTRAVENOUS ONCE
Status: COMPLETED | OUTPATIENT
Start: 2020-04-25 | End: 2020-04-25

## 2020-04-25 RX ORDER — ACETAMINOPHEN 650 MG/1
650 SUPPOSITORY RECTAL EVERY 6 HOURS PRN
Status: DISCONTINUED | OUTPATIENT
Start: 2020-04-25 | End: 2020-04-27 | Stop reason: HOSPADM

## 2020-04-25 RX ORDER — DEXTROSE MONOHYDRATE 25 G/50ML
12.5 INJECTION, SOLUTION INTRAVENOUS PRN
Status: DISCONTINUED | OUTPATIENT
Start: 2020-04-25 | End: 2020-04-27 | Stop reason: HOSPADM

## 2020-04-25 RX ORDER — PROMETHAZINE HYDROCHLORIDE 25 MG/1
12.5 TABLET ORAL EVERY 6 HOURS PRN
Status: DISCONTINUED | OUTPATIENT
Start: 2020-04-25 | End: 2020-04-27 | Stop reason: HOSPADM

## 2020-04-25 RX ORDER — MIRTAZAPINE 15 MG/1
15 TABLET, FILM COATED ORAL DAILY
Status: DISCONTINUED | OUTPATIENT
Start: 2020-04-26 | End: 2020-04-27 | Stop reason: HOSPADM

## 2020-04-25 RX ORDER — DEXTROSE MONOHYDRATE 50 MG/ML
100 INJECTION, SOLUTION INTRAVENOUS PRN
Status: DISCONTINUED | OUTPATIENT
Start: 2020-04-25 | End: 2020-04-27 | Stop reason: HOSPADM

## 2020-04-25 RX ORDER — BUSPIRONE HYDROCHLORIDE 10 MG/1
10 TABLET ORAL DAILY
Status: DISCONTINUED | OUTPATIENT
Start: 2020-04-26 | End: 2020-04-25

## 2020-04-25 RX ORDER — POLYETHYLENE GLYCOL 3350 17 G/17G
17 POWDER, FOR SOLUTION ORAL DAILY PRN
Status: DISCONTINUED | OUTPATIENT
Start: 2020-04-25 | End: 2020-04-27 | Stop reason: HOSPADM

## 2020-04-25 RX ORDER — AMLODIPINE BESYLATE 10 MG/1
10 TABLET ORAL DAILY
Status: DISCONTINUED | OUTPATIENT
Start: 2020-04-26 | End: 2020-04-27 | Stop reason: HOSPADM

## 2020-04-25 RX ORDER — SODIUM CHLORIDE 0.9 % (FLUSH) 0.9 %
10 SYRINGE (ML) INJECTION EVERY 12 HOURS SCHEDULED
Status: DISCONTINUED | OUTPATIENT
Start: 2020-04-25 | End: 2020-04-27 | Stop reason: HOSPADM

## 2020-04-25 RX ADMIN — IOPAMIDOL 110 ML: 755 INJECTION, SOLUTION INTRAVENOUS at 19:34

## 2020-04-25 RX ADMIN — CEFTRIAXONE SODIUM 1 G: 1 INJECTION, POWDER, FOR SOLUTION INTRAMUSCULAR; INTRAVENOUS at 23:32

## 2020-04-25 RX ADMIN — SODIUM CHLORIDE 1000 ML: 9 INJECTION, SOLUTION INTRAVENOUS at 17:56

## 2020-04-25 RX ADMIN — Medication 10 ML: at 23:35

## 2020-04-25 RX ADMIN — CEFTRIAXONE SODIUM 1 G: 1 INJECTION, POWDER, FOR SOLUTION INTRAMUSCULAR; INTRAVENOUS at 18:12

## 2020-04-25 RX ADMIN — Medication 2 MG: at 20:27

## 2020-04-25 ASSESSMENT — PAIN SCALES - GENERAL
PAINLEVEL_OUTOF10: 8
PAINLEVEL_OUTOF10: 4
PAINLEVEL_OUTOF10: 0
PAINLEVEL_OUTOF10: 10

## 2020-04-25 ASSESSMENT — PAIN DESCRIPTION - LOCATION: LOCATION: ABDOMEN;BUTTOCKS

## 2020-04-25 NOTE — DISCHARGE INSTR - COC
Delivery:146047589}    Wound Care Documentation and Therapy:        Elimination:  Continence:   · Bowel: {YES / QY:80351}  · Bladder: {YES / DX:43202}  Urinary Catheter: {Urinary Catheter:058380107}   Colostomy/Ileostomy/Ileal Conduit: {YES / WA:83746}       Date of Last BM: ***  No intake or output data in the 24 hours ending 20  No intake/output data recorded.     Safety Concerns:     [address] [facility] Safety Concerns:476864105}    Impairments/Disabilities:      [address] [facility] Impairments/Disabilities:932421700}    Nutrition Therapy:  Current Nutrition Therapy:   [address] [facility] Diet List:509095169}    Routes of Feeding: {CHP DME Other Feedings:937281995}  Liquids: {Slp liquid thickness:74479}  Daily Fluid Restriction: {CHP DME Yes amt example:373269762}  Last Modified Barium Swallow with Video (Video Swallowing Test): {Done Not Done OEMB:569935778}    Treatments at the Time of Hospital Discharge:   Respiratory Treatments: ***  Oxygen Therapy:  {Therapy; copd oxygen:29519}  Ventilator:    { CC Vent DCEM:348053183}    Rehab Therapies: {THERAPEUTIC INTERVENTION:6441536944}  Weight Bearing Status/Restrictions: [address]  Weight Bearin}  Other Medical Equipment (for information only, NOT a DME order):  {EQUIPMENT:571819584}  Other Treatments: ***    Patient's personal belongings (please select all that are sent with patient):  {[facility] DME Belongings:048437918}    RN SIGNATURE:  {Esignature:048601383}    CASE MANAGEMENT/SOCIAL WORK SECTION    Inpatient Status Date: ***    Readmission Risk Assessment Score:  Readmission Risk              Risk of Unplanned Readmission:        0           Discharging to Facility/ Agency   · Name:   · Address:  · Phone:  · Fax:    Dialysis Facility (if applicable)   · Name:  · Address:  · Dialysis Schedule:  · Phone:  · Fax:    / signature: {Esignature:734896109}    PHYSICIAN SECTION    Prognosis: {Prognosis:8999273432}    Condition at Discharge: [address] Patient Condition:331127366}    Rehab Potential (if transferring to Rehab): {Prognosis:3859795153}    Recommended Labs or Other Treatments After Discharge: ***    Physician Certification: I certify the above information and transfer of April Pena  is necessary for the continuing treatment of the diagnosis listed and that she requires {Admit to Appropriate Level of Care:65732} for {GREATER/LESS:069387675} 30 days.      Update Admission H&P: [facility]}    PHYSICIAN SIGNATURE:  {Esignature:896507587}

## 2020-04-26 LAB
ANION GAP SERPL CALCULATED.3IONS-SCNC: 16 MMOL/L (ref 7–16)
BUN BLDV-MCNC: 8 MG/DL (ref 8–23)
CALCIUM SERPL-MCNC: 9.8 MG/DL (ref 8.6–10.2)
CHLORIDE BLD-SCNC: 98 MMOL/L (ref 98–107)
CO2: 23 MMOL/L (ref 22–29)
CREAT SERPL-MCNC: 0.7 MG/DL (ref 0.5–1)
GFR AFRICAN AMERICAN: >60
GFR NON-AFRICAN AMERICAN: >60 ML/MIN/1.73
GLUCOSE BLD-MCNC: 197 MG/DL (ref 74–99)
HCT VFR BLD CALC: 36.5 % (ref 34–48)
HEMOGLOBIN: 12.2 G/DL (ref 11.5–15.5)
MCH RBC QN AUTO: 29.6 PG (ref 26–35)
MCHC RBC AUTO-ENTMCNC: 33.4 % (ref 32–34.5)
MCV RBC AUTO: 88.6 FL (ref 80–99.9)
METER GLUCOSE: 134 MG/DL (ref 74–99)
METER GLUCOSE: 204 MG/DL (ref 74–99)
METER GLUCOSE: 207 MG/DL (ref 74–99)
METER GLUCOSE: 209 MG/DL (ref 74–99)
PDW BLD-RTO: 13.4 FL (ref 11.5–15)
PLATELET # BLD: 255 E9/L (ref 130–450)
PMV BLD AUTO: 11 FL (ref 7–12)
POTASSIUM REFLEX MAGNESIUM: 3.7 MMOL/L (ref 3.5–5)
RBC # BLD: 4.12 E12/L (ref 3.5–5.5)
SODIUM BLD-SCNC: 137 MMOL/L (ref 132–146)
URINE CULTURE, ROUTINE: NORMAL
WBC # BLD: 18 E9/L (ref 4.5–11.5)

## 2020-04-26 PROCEDURE — 6370000000 HC RX 637 (ALT 250 FOR IP): Performed by: INTERNAL MEDICINE

## 2020-04-26 PROCEDURE — 80048 BASIC METABOLIC PNL TOTAL CA: CPT

## 2020-04-26 PROCEDURE — 2580000003 HC RX 258: Performed by: INTERNAL MEDICINE

## 2020-04-26 PROCEDURE — 6360000002 HC RX W HCPCS: Performed by: INTERNAL MEDICINE

## 2020-04-26 PROCEDURE — 36415 COLL VENOUS BLD VENIPUNCTURE: CPT

## 2020-04-26 PROCEDURE — 82962 GLUCOSE BLOOD TEST: CPT

## 2020-04-26 PROCEDURE — 85027 COMPLETE CBC AUTOMATED: CPT

## 2020-04-26 PROCEDURE — 1200000000 HC SEMI PRIVATE

## 2020-04-26 RX ADMIN — ENOXAPARIN SODIUM 40 MG: 40 INJECTION SUBCUTANEOUS at 09:45

## 2020-04-26 RX ADMIN — Medication 10 ML: at 09:46

## 2020-04-26 RX ADMIN — AMLODIPINE BESYLATE 10 MG: 10 TABLET ORAL at 09:44

## 2020-04-26 RX ADMIN — VITAM B12 100 MCG: 100 TAB at 09:44

## 2020-04-26 RX ADMIN — LEVOTHYROXINE SODIUM 25 MCG: 0.03 TABLET ORAL at 06:23

## 2020-04-26 RX ADMIN — ALOGLIPTIN 12.5 MG: 12.5 TABLET, FILM COATED ORAL at 09:44

## 2020-04-26 RX ADMIN — CALCIUM POLYCARBOPHIL 625 MG TABLET 625 MG: at 09:45

## 2020-04-26 RX ADMIN — CEFTRIAXONE SODIUM 1 G: 1 INJECTION, POWDER, FOR SOLUTION INTRAMUSCULAR; INTRAVENOUS at 20:58

## 2020-04-26 RX ADMIN — INSULIN LISPRO 4 UNITS: 100 INJECTION, SOLUTION INTRAVENOUS; SUBCUTANEOUS at 09:46

## 2020-04-26 RX ADMIN — INSULIN LISPRO 4 UNITS: 100 INJECTION, SOLUTION INTRAVENOUS; SUBCUTANEOUS at 12:55

## 2020-04-26 RX ADMIN — MIRTAZAPINE 15 MG: 15 TABLET, FILM COATED ORAL at 09:44

## 2020-04-26 RX ADMIN — ACETAMINOPHEN 650 MG: 325 TABLET, FILM COATED ORAL at 12:55

## 2020-04-26 RX ADMIN — ASPIRIN 81 MG: 81 TABLET, COATED ORAL at 09:44

## 2020-04-26 RX ADMIN — ESCITALOPRAM 20 MG: 10 TABLET, FILM COATED ORAL at 09:44

## 2020-04-26 RX ADMIN — INSULIN LISPRO 2 UNITS: 100 INJECTION, SOLUTION INTRAVENOUS; SUBCUTANEOUS at 21:51

## 2020-04-26 RX ADMIN — Medication 10 ML: at 20:59

## 2020-04-26 ASSESSMENT — PAIN SCALES - GENERAL
PAINLEVEL_OUTOF10: 0

## 2020-04-26 NOTE — H&P
midline. Chest: Even excursion   Lungs: Clear to auscultation. No rhonchi, crackles or rales. Heart: S1 > S2. Rhythm is regular and rate is normal. No gallop rub or murmur. Abdomen: Soft, mildly protuberant, non-tender. BS normal. No masses, organomegaly. Anatomic contours appear normal.  Extremities: No deformities, edema, or skin discoloration. Peripheral perfusion assessed in all exremities. No cyanosis  Musculoskeletal: No unusual pain or swelling. Muscular strength intact. Neuro:   · Cranial nerves grossly intact. · Motor: Strength grossly normal. No focal weakness. · Sensory: grossly normal to touch. · Cerebellar testing was grossly normal  Mental status: Awake, alert, cognizant of person, place, time. Patient appears capable of directing self care   Mood: Normal and appropriate affect  Gait & balance: not assessed:     Labs     CBC:   Lab Results   Component Value Date    WBC 18.0 04/26/2020    RBC 4.12 04/26/2020    HGB 12.2 04/26/2020    HCT 36.5 04/26/2020     04/26/2020    MCV 88.6 04/26/2020     BMP:    Lab Results   Component Value Date     04/26/2020    K 3.7 04/26/2020    CL 98 04/26/2020    CO2 23 04/26/2020    BUN 8 04/26/2020    CREATININE 0.7 04/26/2020    GLUCOSE 197 04/26/2020    CALCIUM 9.8 04/26/2020     Hepatic Function Panel:    Lab Results   Component Value Date    ALKPHOS 48 04/25/2020    AST 29 04/25/2020    ALT 31 04/25/2020    PROT 7.8 04/25/2020    LABALBU 4.7 04/25/2020    BILITOT 0.5 04/25/2020     Magnesium:  No results found for: MG  Cardiac Enzymes:   Lab Results   Component Value Date    TROPONINI <0.01 05/11/2019    TROPONINI <0.01 01/03/2019    TROPONINI <0.01 12/17/2018     LDH:  No results found for: LDH  PT/INR:  No results found for: PROTIME, INR  BNP: No results for input(s): BNP in the last 72 hours.    TSH:   Lab Results   Component Value Date    TSH 2.660 05/11/2019      Cardiac Injury Profile: No results for input(s): CKTOTAL, CKMB, [place] in the last 72 hours.    Lipid Profile:   Lab Results   Component Value Date    TRIG 190 08/03/2015    HDL 49 08/03/2015    LDLCALC 73 08/03/2015    CHOL 160 08/03/2015      Hemoglobin A1C: No components found for: HGBA1C   U/A:   Lab Results   Component Value Date    LEUKOCYTESUR MODERATE 04/25/2020    PHUR 6.0 04/25/2020    WBCUA 10-20 04/25/2020    RBCUA 1-3 04/25/2020    BACTERIA MANY 04/25/2020    SPECGRAV 1.020 04/25/2020    BLOODU SMALL 04/25/2020    GLUCOSEU 500 04/25/2020         ADMISSION SCHEDULED MEDS:   Current Facility-Administered Medications   Medication Dose Route Frequency Provider Last Rate Last Dose    sodium chloride flush 0.9 % injection 10 mL  10 mL Intravenous PRN Glo Dougherty II, MD        amLODIPine (NORVASC) tablet 10 mg  10 mg Oral Daily Dario Philippe MD   10 mg at 04/26/20 0944    aspirin EC tablet 81 mg  81 mg Oral Daily Dario Philippe MD   81 mg at 04/26/20 0944    atorvastatin (LIPITOR) tablet 20 mg  20 mg Oral Once Dario Philippe MD        vitamin B-12 (CYANOCOBALAMIN) tablet 100 mcg  100 mcg Oral Daily Dario Philippe MD   100 mcg at 04/26/20 0944    escitalopram (LEXAPRO) tablet 20 mg  20 mg Oral Daily Dario Philippe MD   20 mg at 04/26/20 0944    levothyroxine (SYNTHROID) tablet 25 mcg  25 mcg Oral Daily Dario Philippe MD   25 mcg at 04/26/20 4789    mirtazapine (REMERON) tablet 15 mg  15 mg Oral Daily Dario Philippe MD   15 mg at 04/26/20 0944    polycarbophil (FIBERCON) tablet 625 mg  625 mg Oral Daily Dario Philippe MD   625 mg at 04/26/20 0945    alogliptin (NESINA) tablet 12.5 mg  12.5 mg Oral Daily Dario Philippe MD   12.5 mg at 04/26/20 0944    sodium chloride flush 0.9 % injection 10 mL  10 mL Intravenous 2 times per day Dario Philippe MD   10 mL at 04/26/20 0946    sodium chloride flush 0.9 % injection 10 mL  10 mL Intravenous PRN Dario Philippe MD        acetaminophen (TYLENOL) tablet 650 mg  650 mg Oral

## 2020-04-26 NOTE — ED NOTES
Verbal consent obtained from patient to release information to Chaz Wilcox ([facility] in [facility]) [phone]. Update given.       Ck Rush RN  04/25/20 2004

## 2020-04-26 NOTE — CONSULTS
PRN  dextrose 50 % IV solution, 12.5 g, Intravenous, PRN  glucagon (rDNA) injection 1 mg, 1 mg, Intramuscular, PRN  dextrose 5 % solution, 100 mL/hr, Intravenous, PRN  insulin lispro (HUMALOG) injection vial 0-12 Units, 0-12 Units, Subcutaneous, TID WC  insulin lispro (HUMALOG) injection vial 0-6 Units, 0-6 Units, Subcutaneous, Nightly  cefTRIAXone (ROCEPHIN) 1 g in sterile water 10 mL IV syringe, 1 g, Intravenous, Q24H  Allergies:  Patient has no known allergies. Social History:   N/C  Family History:   History reviewed. No pertinent family history. REVIEW OF SYSTEMS:    H&P    PHYSICAL EXAM:    VITALS:  BP (!) 142/65   Pulse 99   Temp 100.9 °F (38.3 °C) (Temporal)   Resp 18   Ht 5' 3\" (1.6 m)   Wt 121 lb (54.9 kg)   LMP 2015   SpO2 92%   BMI 21.43 kg/m²   TEMPERATURE:  Current - Temp: 100.9 °F (38.3 °C);  Max - Temp  Av.4 °F (37.4 °C)  Min: 97.3 °F (36.3 °C)  Max: 100.9 °F (38.3 °C)  RESPIRATIONS RANGE: Resp  Av.4  Min: 16  Max: 18  PULSE RANGE: Pulse  Av  Min: 83  Max: 107  BLOOD PRESSURE RANGE:  Systolic (16KTS), VMW:582 , Min:121 , KDQ:159   ; Diastolic (32AWZ), CHASE:77, Min:62, Max:74    CONSTITUTIONAL:  awake, alert, fatigued, no apparent distress, appears stated age and normal weight  EYES:  lids and lashes normal, pupils equal, round and reactive to light, extra-ocular muscles intact and sclera clear  NECK:  supple, symmetrical, trachea midline, skin normal and no stridor  LUNGS:  no increased work of breathing, good air exchange, no retractions and clear to auscultation  CARDIOVASCULAR:  normal apical pulses, regular rate and rhythm and normal S1 and S2  ABDOMEN:  hypoactive bowel sounds, soft, distended, non-tender, voluntary guarding absent and no masses palpated  DATA:    CBC with Differential:    Lab Results   Component Value Date    WBC 18.0 2020    RBC 4.12 2020    HGB 12.2 2020    HCT 36.5 2020     2020    MCV 88.6 2020

## 2020-04-26 NOTE — PLAN OF CARE
Problem: Pain:  Description: Pain management should include both nonpharmacologic and pharmacologic interventions. Goal: Pain level will decrease  Description: Pain level will decrease  Outcome: Met This Shift     Problem: Pain:  Description: Pain management should include both nonpharmacologic and pharmacologic interventions. Goal: Control of acute pain  Description: Control of acute pain  4/26/2020 1121 by Quique Enciso RN  Outcome: Met This Shift     Problem: Falls - Risk of:  Goal: Will remain free from falls  Description: Will remain free from falls  Outcome: Met This Shift     Problem:  Bowel/Gastric:  Goal: Control of bowel function will improve  Description: Control of bowel function will improve  Outcome: Met This Shift     Problem: Urinary Elimination:  Goal: Signs and symptoms of infection will decrease  Description: Signs and symptoms of infection will decrease  Outcome: Met This Shift

## 2020-04-26 NOTE — PLAN OF CARE
Problem: Pain:  Goal: Control of acute pain  Description: Control of acute pain  Outcome: Met This Shift

## 2020-04-27 VITALS
DIASTOLIC BLOOD PRESSURE: 74 MMHG | OXYGEN SATURATION: 92 % | HEART RATE: 100 BPM | SYSTOLIC BLOOD PRESSURE: 120 MMHG | TEMPERATURE: 99.2 F | HEIGHT: 63 IN | RESPIRATION RATE: 16 BRPM | WEIGHT: 121 LBS | BODY MASS INDEX: 21.44 KG/M2

## 2020-04-27 LAB
HCT VFR BLD CALC: 38.4 % (ref 34–48)
HEMOGLOBIN: 12.6 G/DL (ref 11.5–15.5)
MCH RBC QN AUTO: 29.6 PG (ref 26–35)
MCHC RBC AUTO-ENTMCNC: 32.8 % (ref 32–34.5)
MCV RBC AUTO: 90.1 FL (ref 80–99.9)
METER GLUCOSE: 165 MG/DL (ref 74–99)
METER GLUCOSE: 201 MG/DL (ref 74–99)
METER GLUCOSE: 247 MG/DL (ref 74–99)
PDW BLD-RTO: 13.6 FL (ref 11.5–15)
PLATELET # BLD: 250 E9/L (ref 130–450)
PMV BLD AUTO: 10.5 FL (ref 7–12)
RBC # BLD: 4.26 E12/L (ref 3.5–5.5)
WBC # BLD: 12.2 E9/L (ref 4.5–11.5)

## 2020-04-27 PROCEDURE — 97165 OT EVAL LOW COMPLEX 30 MIN: CPT

## 2020-04-27 PROCEDURE — 97162 PT EVAL MOD COMPLEX 30 MIN: CPT | Performed by: PHYSICAL THERAPIST

## 2020-04-27 PROCEDURE — 2580000003 HC RX 258: Performed by: INTERNAL MEDICINE

## 2020-04-27 PROCEDURE — 6360000002 HC RX W HCPCS: Performed by: INTERNAL MEDICINE

## 2020-04-27 PROCEDURE — 97535 SELF CARE MNGMENT TRAINING: CPT

## 2020-04-27 PROCEDURE — 97530 THERAPEUTIC ACTIVITIES: CPT | Performed by: PHYSICAL THERAPIST

## 2020-04-27 PROCEDURE — 6370000000 HC RX 637 (ALT 250 FOR IP): Performed by: INTERNAL MEDICINE

## 2020-04-27 PROCEDURE — 85027 COMPLETE CBC AUTOMATED: CPT

## 2020-04-27 PROCEDURE — 82962 GLUCOSE BLOOD TEST: CPT

## 2020-04-27 PROCEDURE — 36415 COLL VENOUS BLD VENIPUNCTURE: CPT

## 2020-04-27 RX ORDER — VILAZODONE HYDROCHLORIDE 40 MG/1
40 TABLET ORAL DAILY
Qty: 30 TABLET | Refills: 0 | Status: ON HOLD | OUTPATIENT
Start: 2020-04-27 | End: 2020-12-28

## 2020-04-27 RX ORDER — AMOXICILLIN AND CLAVULANATE POTASSIUM 875; 125 MG/1; MG/1
1 TABLET, FILM COATED ORAL EVERY 12 HOURS SCHEDULED
Qty: 20 TABLET | Refills: 0 | Status: SHIPPED | OUTPATIENT
Start: 2020-04-27 | End: 2020-05-07

## 2020-04-27 RX ORDER — AMOXICILLIN AND CLAVULANATE POTASSIUM 875; 125 MG/1; MG/1
1 TABLET, FILM COATED ORAL EVERY 12 HOURS SCHEDULED
Status: DISCONTINUED | OUTPATIENT
Start: 2020-04-27 | End: 2020-04-27 | Stop reason: HOSPADM

## 2020-04-27 RX ORDER — RISPERIDONE 1 MG/1
5 TABLET, FILM COATED ORAL NIGHTLY
Qty: 60 TABLET | Refills: 3 | Status: ON HOLD | OUTPATIENT
Start: 2020-04-27 | End: 2020-12-28

## 2020-04-27 RX ORDER — MEMANTINE HYDROCHLORIDE 5 MG/1
5 TABLET ORAL 2 TIMES DAILY
COMMUNITY

## 2020-04-27 RX ORDER — LANOLIN ALCOHOL/MO/W.PET/CERES
3 CREAM (GRAM) TOPICAL NIGHTLY
Qty: 30 TABLET | Refills: 0 | Status: ON HOLD | OUTPATIENT
Start: 2020-04-27 | End: 2020-12-28

## 2020-04-27 RX ADMIN — ENOXAPARIN SODIUM 40 MG: 40 INJECTION SUBCUTANEOUS at 09:12

## 2020-04-27 RX ADMIN — INSULIN LISPRO 4 UNITS: 100 INJECTION, SOLUTION INTRAVENOUS; SUBCUTANEOUS at 13:09

## 2020-04-27 RX ADMIN — INSULIN LISPRO 2 UNITS: 100 INJECTION, SOLUTION INTRAVENOUS; SUBCUTANEOUS at 09:12

## 2020-04-27 RX ADMIN — LEVOTHYROXINE SODIUM 25 MCG: 0.03 TABLET ORAL at 05:54

## 2020-04-27 RX ADMIN — VITAM B12 100 MCG: 100 TAB at 09:12

## 2020-04-27 RX ADMIN — MIRTAZAPINE 15 MG: 15 TABLET, FILM COATED ORAL at 09:11

## 2020-04-27 RX ADMIN — ALOGLIPTIN 12.5 MG: 12.5 TABLET, FILM COATED ORAL at 09:11

## 2020-04-27 RX ADMIN — AMLODIPINE BESYLATE 10 MG: 10 TABLET ORAL at 09:11

## 2020-04-27 RX ADMIN — AMOXICILLIN AND CLAVULANATE POTASSIUM 1 TABLET: 875; 125 TABLET, FILM COATED ORAL at 09:11

## 2020-04-27 RX ADMIN — ESCITALOPRAM 20 MG: 10 TABLET, FILM COATED ORAL at 09:11

## 2020-04-27 RX ADMIN — CALCIUM POLYCARBOPHIL 625 MG TABLET 625 MG: at 09:12

## 2020-04-27 RX ADMIN — Medication 10 ML: at 09:12

## 2020-04-27 RX ADMIN — INSULIN LISPRO 4 UNITS: 100 INJECTION, SOLUTION INTRAVENOUS; SUBCUTANEOUS at 17:26

## 2020-04-27 RX ADMIN — ASPIRIN 81 MG: 81 TABLET, COATED ORAL at 09:11

## 2020-04-27 ASSESSMENT — PAIN SCALES - GENERAL
PAINLEVEL_OUTOF10: 0
PAINLEVEL_OUTOF10: 0

## 2020-04-27 NOTE — DISCHARGE SUMMARY
with sagittal and coronal reconstructions 353 images 110 mL of Isovue-370 Diffuse abdominal pain 80year-old female patient, history of hypertension diabetes and hyperlipidemia. FINDINGS: The liver has some signs of fat replacement, but has normal size. In a single phase which is a mixed of arterial/portal venous/hepatic venous phase there are peripheral areas of ill-defined vague subcapsular enhancement of the liver parenchyma in the right lobe and in the left lobe as well. These will required a multiphase study, MRI would be in better advantage than CT for proper characterization. Also correlation with the ultrasound could be considered as a additional co-adjuvant of characterization. Gallbladder is normally distended. Tiny low density stones are seen. The this can be also corroborated by ultrasound. There is a diverticular the second segment of the bladder which has fluid levels. The intrahepatic biliary tree is not dilated. The common bile duct dilates in the head of the pancreas just proximal to the papilla which extends into the bladder diverticulum up to 10 mm and the pancreatic duct is dilated in the head of the pancreas up to 5 mm. The pancreas has generalized atrophy and there is no dilatation of the pancreatic duct towards the body and tail of the pancreas are. The spleen has normal size. Small low-density lesions seen centrally in the spleen was observed on the previous study of October 11, 2006. The size appears to be larger since the previous examination, additional one seen on the previous examination are no longer visualized. Adrenals not enlarged. Kidneys have preserved size and cortical thickness. The small cysts seen in the right kidney but difficult to be characterized due to partial volume average. There is normal contrast enhancement by the kidneys and excretion of the contrast by the kidneys.  The The bladder is fairly distended considering the patient size, the bladder measures 11.2 x 10.5 x capsule  Take 1 capsule by mouth every morning (before breakfast)             mirtazapine (REMERON) 15 MG tablet  Take 15 mg by mouth daily             Multiple Vitamins-Minerals (CENTRUM SILVER ADULT 50+) TABS  Take 1 tablet by mouth daily             polycarbophil (FIBERCON) 625 MG tablet  Take 625 mg by mouth daily             sitaGLIPtin (JANUVIA) 50 MG tablet  Take 1 tablet by mouth daily                 Discharge Exam:  General appearance: Normal, awake, alert no distress. Skin: Color, texture, turgor normal. No rashes or lesions. Head: Normocephalic. No masses, lesions, tenderness or abnormalities   Face: Symmetric no visible lesions  Eyes: Conjunctivae/cornea clear. [place]. Sclera non icteric. Ears: External appearance normal.  Hearing grossly normal  Nose/Sinuses: Nares normal. No paranasal sinus tenderness. Mouth: Lips and tongue appear normal. Dentition noted  Neck:  Symmetric. No adenopathy. Thyroid symmetric, normal size, without nodules. Trachea is midline. Chest: Even excursion   Lungs: Clear to auscultation. No rhonchi, crackles or rales. Heart: S1 > S2. Rhythm is regular and rate is normal. No gallop rub or murmur. Abdomen: Soft, mildly protuberant, non-tender. BS normal. No masses, organomegaly. Anatomic contours appear normal.  Extremities: No deformities, edema, or skin discoloration. Peripheral perfusion assessed in all exremities. No cyanosis  Musculoskeletal: No unusual pain or swelling. Muscular strength intact. Neuro:   · Cranial nerves grossly intact. Mental status: Awake, alert, cognizant of person, place, time.     Patient appears capable of directing self care   Mood: Normal and appropriate affect  Independently ambulatory      Disposition: home    Staff is to contact her sister to confirm that she remains on the exact same psychiatric medications as she came in on      Patient Instructions:   REFER TO AVR or TARI document    Signed:  Mayra Eid MD [facility]  [employer]

## 2020-04-27 NOTE — PROGRESS NOTES
While walking with therapy, patient has loose liquid brown bowel movement. Pajama pants soiled, patient finished on toilet. Patient cleaned and assisted to bed.

## 2020-04-27 NOTE — CARE COORDINATION
I called and spoke to the patient's sister Winn Aschoff to explain role, discuss therapy evals and transition of care. Winn Aschoff said the patient has 24hr caregivers 7 days a week via family but she would like home health care for her sister. She declined a list of agencies. She would like to use [facility]. Referral made to Danielle at [facility]. Need home health care orders for CPA, med compliance, PT/OT, nurse aide and social work for discharge today. Charge nurse notified. Winn Aschoff said that she will transport her sister home today.   Dana Mata RN CM

## 2020-04-27 NOTE — PLAN OF CARE
Problem: Pain:  Description: Pain management should include both nonpharmacologic and pharmacologic interventions. Goal: Pain level will decrease  Description: Pain level will decrease  Outcome: Met This Shift     Problem: Pain:  Description: Pain management should include both nonpharmacologic and pharmacologic interventions.   Goal: Control of acute pain  Description: Control of acute pain  Outcome: Met This Shift     Problem: Falls - Risk of:  Goal: Will remain free from falls  Description: Will remain free from falls  Outcome: Met This Shift     Problem: Falls - Risk of:  Goal: Absence of physical injury  Description: Absence of physical injury  Outcome: Met This Shift     Problem: Urinary Elimination:  Goal: Signs and symptoms of infection will decrease  Description: Signs and symptoms of infection will decrease  Outcome: Met This Shift

## 2020-04-27 NOTE — PROGRESS NOTES
Patient ate 100% of low fiber diet for dinner and tolerated well with no complaints of nausea/pain. All discharge instructions reviewed with patient's sister via phone call. Patient's sister verbalizes understanding of all medications and importance of follow up appointments. Patient's IV removed.

## 2020-04-30 LAB — BLOOD CULTURE, ROUTINE: NORMAL

## 2020-05-25 PROBLEM — N39.0 UTI (URINARY TRACT INFECTION): Status: RESOLVED | Noted: 2020-04-25 | Resolved: 2020-05-25

## 2020-12-24 ENCOUNTER — HOSPITAL ENCOUNTER (INPATIENT)
Age: 85
LOS: 4 days | Discharge: HOME HEALTH | DRG: 177 | End: 2020-12-29
Attending: EMERGENCY MEDICINE | Admitting: INTERNAL MEDICINE
Payer: MEDICARE

## 2020-12-24 ENCOUNTER — APPOINTMENT (OUTPATIENT)
Dept: GENERAL RADIOLOGY | Age: 85
DRG: 177 | End: 2020-12-24
Payer: MEDICARE

## 2020-12-24 ENCOUNTER — APPOINTMENT (OUTPATIENT)
Dept: CT IMAGING | Age: 85
DRG: 177 | End: 2020-12-24
Payer: MEDICARE

## 2020-12-24 DIAGNOSIS — J18.9 PNEUMONIA DUE TO ORGANISM: ICD-10-CM

## 2020-12-24 DIAGNOSIS — U07.1 COVID-19: ICD-10-CM

## 2020-12-24 DIAGNOSIS — J96.01 ACUTE RESPIRATORY FAILURE WITH HYPOXIA (HCC): Primary | ICD-10-CM

## 2020-12-24 LAB
ALBUMIN SERPL-MCNC: 3.2 G/DL (ref 3.5–5.2)
ALP BLD-CCNC: 56 U/L (ref 35–104)
ALT SERPL-CCNC: 21 U/L (ref 0–32)
ANION GAP SERPL CALCULATED.3IONS-SCNC: 10 MMOL/L (ref 7–16)
AST SERPL-CCNC: 32 U/L (ref 0–31)
BACTERIA: ABNORMAL /HPF
BASOPHILS ABSOLUTE: 0.04 E9/L (ref 0–0.2)
BASOPHILS RELATIVE PERCENT: 0.4 % (ref 0–2)
BILIRUB SERPL-MCNC: 0.5 MG/DL (ref 0–1.2)
BILIRUBIN URINE: NEGATIVE
BLOOD, URINE: NEGATIVE
BUN BLDV-MCNC: 16 MG/DL (ref 8–23)
CALCIUM SERPL-MCNC: 10.4 MG/DL (ref 8.6–10.2)
CHLORIDE BLD-SCNC: 100 MMOL/L (ref 98–107)
CLARITY: CLEAR
CO2: 23 MMOL/L (ref 22–29)
COLOR: YELLOW
CREAT SERPL-MCNC: 0.7 MG/DL (ref 0.5–1)
EOSINOPHILS ABSOLUTE: 0.06 E9/L (ref 0.05–0.5)
EOSINOPHILS RELATIVE PERCENT: 0.5 % (ref 0–6)
GFR AFRICAN AMERICAN: >60
GFR NON-AFRICAN AMERICAN: >60 ML/MIN/1.73
GLUCOSE BLD-MCNC: 324 MG/DL (ref 74–99)
GLUCOSE URINE: 250 MG/DL
HCT VFR BLD CALC: 34.3 % (ref 34–48)
HEMOGLOBIN: 11.8 G/DL (ref 11.5–15.5)
IMMATURE GRANULOCYTES #: 0.23 E9/L
IMMATURE GRANULOCYTES %: 2 % (ref 0–5)
KETONES, URINE: NEGATIVE MG/DL
LACTIC ACID: 2 MMOL/L (ref 0.5–2.2)
LEUKOCYTE ESTERASE, URINE: NEGATIVE
LYMPHOCYTES ABSOLUTE: 0.96 E9/L (ref 1.5–4)
LYMPHOCYTES RELATIVE PERCENT: 8.5 % (ref 20–42)
MCH RBC QN AUTO: 29.4 PG (ref 26–35)
MCHC RBC AUTO-ENTMCNC: 34.4 % (ref 32–34.5)
MCV RBC AUTO: 85.3 FL (ref 80–99.9)
MONOCYTES ABSOLUTE: 1.06 E9/L (ref 0.1–0.95)
MONOCYTES RELATIVE PERCENT: 9.3 % (ref 2–12)
NEUTROPHILS ABSOLUTE: 8.99 E9/L (ref 1.8–7.3)
NEUTROPHILS RELATIVE PERCENT: 79.3 % (ref 43–80)
NITRITE, URINE: NEGATIVE
PDW BLD-RTO: 12.9 FL (ref 11.5–15)
PH UA: 6 (ref 5–9)
PLATELET # BLD: 275 E9/L (ref 130–450)
PMV BLD AUTO: 10 FL (ref 7–12)
POTASSIUM REFLEX MAGNESIUM: 3.7 MMOL/L (ref 3.5–5)
PROTEIN UA: 30 MG/DL
RBC # BLD: 4.02 E12/L (ref 3.5–5.5)
RBC UA: ABNORMAL /HPF (ref 0–2)
SARS-COV-2, NAAT: NOT DETECTED
SODIUM BLD-SCNC: 133 MMOL/L (ref 132–146)
SPECIFIC GRAVITY UA: >=1.03 (ref 1–1.03)
TOTAL PROTEIN: 6.5 G/DL (ref 6.4–8.3)
TROPONIN: <0.01 NG/ML (ref 0–0.03)
TSH SERPL DL<=0.05 MIU/L-ACNC: 2.5 UIU/ML (ref 0.27–4.2)
UROBILINOGEN, URINE: 0.2 E.U./DL
WBC # BLD: 11.3 E9/L (ref 4.5–11.5)
WBC UA: ABNORMAL /HPF (ref 0–5)

## 2020-12-24 PROCEDURE — 80053 COMPREHEN METABOLIC PANEL: CPT

## 2020-12-24 PROCEDURE — 87088 URINE BACTERIA CULTURE: CPT

## 2020-12-24 PROCEDURE — 83605 ASSAY OF LACTIC ACID: CPT

## 2020-12-24 PROCEDURE — 87040 BLOOD CULTURE FOR BACTERIA: CPT

## 2020-12-24 PROCEDURE — 81001 URINALYSIS AUTO W/SCOPE: CPT

## 2020-12-24 PROCEDURE — U0002 COVID-19 LAB TEST NON-CDC: HCPCS

## 2020-12-24 PROCEDURE — 84443 ASSAY THYROID STIM HORMONE: CPT

## 2020-12-24 PROCEDURE — 84484 ASSAY OF TROPONIN QUANT: CPT

## 2020-12-24 PROCEDURE — 2580000003 HC RX 258: Performed by: STUDENT IN AN ORGANIZED HEALTH CARE EDUCATION/TRAINING PROGRAM

## 2020-12-24 PROCEDURE — 96375 TX/PRO/DX INJ NEW DRUG ADDON: CPT

## 2020-12-24 PROCEDURE — 71045 X-RAY EXAM CHEST 1 VIEW: CPT

## 2020-12-24 PROCEDURE — 99285 EMERGENCY DEPT VISIT HI MDM: CPT

## 2020-12-24 PROCEDURE — 2500000003 HC RX 250 WO HCPCS: Performed by: EMERGENCY MEDICINE

## 2020-12-24 PROCEDURE — 96365 THER/PROPH/DIAG IV INF INIT: CPT

## 2020-12-24 PROCEDURE — 2580000003 HC RX 258: Performed by: EMERGENCY MEDICINE

## 2020-12-24 PROCEDURE — 71275 CT ANGIOGRAPHY CHEST: CPT

## 2020-12-24 PROCEDURE — 6360000002 HC RX W HCPCS: Performed by: EMERGENCY MEDICINE

## 2020-12-24 PROCEDURE — 51701 INSERT BLADDER CATHETER: CPT

## 2020-12-24 PROCEDURE — 93005 ELECTROCARDIOGRAM TRACING: CPT | Performed by: EMERGENCY MEDICINE

## 2020-12-24 PROCEDURE — 85025 COMPLETE CBC W/AUTO DIFF WBC: CPT

## 2020-12-24 RX ORDER — 0.9 % SODIUM CHLORIDE 0.9 %
1000 INTRAVENOUS SOLUTION INTRAVENOUS ONCE
Status: COMPLETED | OUTPATIENT
Start: 2020-12-24 | End: 2020-12-24

## 2020-12-24 RX ORDER — SODIUM CHLORIDE 0.9 % (FLUSH) 0.9 %
10 SYRINGE (ML) INJECTION PRN
Status: COMPLETED | OUTPATIENT
Start: 2020-12-24 | End: 2020-12-25

## 2020-12-24 RX ADMIN — DOXYCYCLINE 100 MG: 100 INJECTION, POWDER, LYOPHILIZED, FOR SOLUTION INTRAVENOUS at 23:03

## 2020-12-24 RX ADMIN — SODIUM CHLORIDE 1000 ML: 9 INJECTION, SOLUTION INTRAVENOUS at 21:56

## 2020-12-24 RX ADMIN — CEFTRIAXONE SODIUM 1 G: 1 INJECTION, POWDER, FOR SOLUTION INTRAMUSCULAR; INTRAVENOUS at 23:02

## 2020-12-24 ASSESSMENT — ENCOUNTER SYMPTOMS
COUGH: 1
TROUBLE SWALLOWING: 0
NAUSEA: 0
SHORTNESS OF BREATH: 0
BACK PAIN: 0
DIARRHEA: 0
VOMITING: 0
PHOTOPHOBIA: 0
ABDOMINAL PAIN: 0

## 2020-12-24 NOTE — Clinical Note
Patient Class: Inpatient [101]   REQUIRED: Diagnosis: Acute respiratory failure with hypoxia ([facility] [address] [[id number]]   Estimated Length of Stay: Estimated stay of more than 2 midnights   Admitting Provider: Tayler Reed [[id number]]   Telemetry Bed Required?: Yes

## 2020-12-25 PROBLEM — J96.01 ACUTE RESPIRATORY FAILURE WITH HYPOXIA (HCC): Status: ACTIVE | Noted: 2020-12-25

## 2020-12-25 LAB
ALBUMIN SERPL-MCNC: 3.2 G/DL (ref 3.5–5.2)
ALP BLD-CCNC: 58 U/L (ref 35–104)
ALT SERPL-CCNC: 24 U/L (ref 0–32)
ANION GAP SERPL CALCULATED.3IONS-SCNC: 11 MMOL/L (ref 7–16)
AST SERPL-CCNC: 34 U/L (ref 0–31)
BILIRUB SERPL-MCNC: 0.5 MG/DL (ref 0–1.2)
BUN BLDV-MCNC: 15 MG/DL (ref 8–23)
CALCIUM SERPL-MCNC: 9.7 MG/DL (ref 8.6–10.2)
CHLORIDE BLD-SCNC: 100 MMOL/L (ref 98–107)
CO2: 22 MMOL/L (ref 22–29)
CREAT SERPL-MCNC: 0.7 MG/DL (ref 0.5–1)
EKG ATRIAL RATE: 88 BPM
EKG ATRIAL RATE: 90 BPM
EKG ATRIAL RATE: 90 BPM
EKG P AXIS: 45 DEGREES
EKG P AXIS: 46 DEGREES
EKG P AXIS: 57 DEGREES
EKG P-R INTERVAL: 166 MS
EKG P-R INTERVAL: 166 MS
EKG P-R INTERVAL: 168 MS
EKG Q-T INTERVAL: 302 MS
EKG Q-T INTERVAL: 362 MS
EKG Q-T INTERVAL: 362 MS
EKG QRS DURATION: 80 MS
EKG QRS DURATION: 80 MS
EKG QRS DURATION: 82 MS
EKG QTC CALCULATION (BAZETT): 365 MS
EKG QTC CALCULATION (BAZETT): 442 MS
EKG QTC CALCULATION (BAZETT): 442 MS
EKG R AXIS: -17 DEGREES
EKG R AXIS: -18 DEGREES
EKG R AXIS: -29 DEGREES
EKG T AXIS: 26 DEGREES
EKG T AXIS: 29 DEGREES
EKG VENTRICULAR RATE: 88 BPM
EKG VENTRICULAR RATE: 90 BPM
EKG VENTRICULAR RATE: 90 BPM
GFR AFRICAN AMERICAN: >60
GFR NON-AFRICAN AMERICAN: >60 ML/MIN/1.73
GLUCOSE BLD-MCNC: 386 MG/DL (ref 74–99)
HCT VFR BLD CALC: 38 % (ref 34–48)
HEMOGLOBIN: 12.9 G/DL (ref 11.5–15.5)
MCH RBC QN AUTO: 29.4 PG (ref 26–35)
MCHC RBC AUTO-ENTMCNC: 33.9 % (ref 32–34.5)
MCV RBC AUTO: 86.6 FL (ref 80–99.9)
METER GLUCOSE: 298 MG/DL (ref 74–99)
METER GLUCOSE: 332 MG/DL (ref 74–99)
METER GLUCOSE: 393 MG/DL (ref 74–99)
METER GLUCOSE: 434 MG/DL (ref 74–99)
PDW BLD-RTO: 12.8 FL (ref 11.5–15)
PLATELET # BLD: 298 E9/L (ref 130–450)
PMV BLD AUTO: 10.4 FL (ref 7–12)
POTASSIUM REFLEX MAGNESIUM: 3.9 MMOL/L (ref 3.5–5)
PROCALCITONIN: 0.14 NG/ML (ref 0–0.08)
RBC # BLD: 4.39 E12/L (ref 3.5–5.5)
SODIUM BLD-SCNC: 133 MMOL/L (ref 132–146)
TOTAL PROTEIN: 6.9 G/DL (ref 6.4–8.3)
WBC # BLD: 8.5 E9/L (ref 4.5–11.5)

## 2020-12-25 PROCEDURE — 82962 GLUCOSE BLOOD TEST: CPT

## 2020-12-25 PROCEDURE — U0003 INFECTIOUS AGENT DETECTION BY NUCLEIC ACID (DNA OR RNA); SEVERE ACUTE RESPIRATORY SYNDROME CORONAVIRUS 2 (SARS-COV-2) (CORONAVIRUS DISEASE [COVID-19]), AMPLIFIED PROBE TECHNIQUE, MAKING USE OF HIGH THROUGHPUT TECHNOLOGIES AS DESCRIBED BY CMS-2020-01-R: HCPCS

## 2020-12-25 PROCEDURE — 2580000003 HC RX 258: Performed by: RADIOLOGY

## 2020-12-25 PROCEDURE — 80053 COMPREHEN METABOLIC PANEL: CPT

## 2020-12-25 PROCEDURE — 93005 ELECTROCARDIOGRAM TRACING: CPT | Performed by: INTERNAL MEDICINE

## 2020-12-25 PROCEDURE — 93010 ELECTROCARDIOGRAM REPORT: CPT | Performed by: INTERNAL MEDICINE

## 2020-12-25 PROCEDURE — 85027 COMPLETE CBC AUTOMATED: CPT

## 2020-12-25 PROCEDURE — 2500000003 HC RX 250 WO HCPCS: Performed by: INTERNAL MEDICINE

## 2020-12-25 PROCEDURE — 6360000002 HC RX W HCPCS: Performed by: STUDENT IN AN ORGANIZED HEALTH CARE EDUCATION/TRAINING PROGRAM

## 2020-12-25 PROCEDURE — 6360000004 HC RX CONTRAST MEDICATION: Performed by: RADIOLOGY

## 2020-12-25 PROCEDURE — 2580000003 HC RX 258: Performed by: INTERNAL MEDICINE

## 2020-12-25 PROCEDURE — 6360000002 HC RX W HCPCS: Performed by: INTERNAL MEDICINE

## 2020-12-25 PROCEDURE — 6370000000 HC RX 637 (ALT 250 FOR IP): Performed by: INTERNAL MEDICINE

## 2020-12-25 PROCEDURE — 36415 COLL VENOUS BLD VENIPUNCTURE: CPT

## 2020-12-25 PROCEDURE — 84145 PROCALCITONIN (PCT): CPT

## 2020-12-25 PROCEDURE — 2700000000 HC OXYGEN THERAPY PER DAY

## 2020-12-25 PROCEDURE — 2060000000 HC ICU INTERMEDIATE R&B

## 2020-12-25 RX ORDER — M-VIT,TX,IRON,MINS/CALC/FOLIC 27MG-0.4MG
1 TABLET ORAL DAILY
Status: DISCONTINUED | OUTPATIENT
Start: 2020-12-25 | End: 2020-12-29 | Stop reason: HOSPADM

## 2020-12-25 RX ORDER — DEXAMETHASONE SODIUM PHOSPHATE 10 MG/ML
10 INJECTION INTRAMUSCULAR; INTRAVENOUS ONCE
Status: COMPLETED | OUTPATIENT
Start: 2020-12-25 | End: 2020-12-25

## 2020-12-25 RX ORDER — DEXAMETHASONE 4 MG/1
6 TABLET ORAL DAILY
Status: DISCONTINUED | OUTPATIENT
Start: 2020-12-25 | End: 2020-12-29 | Stop reason: HOSPADM

## 2020-12-25 RX ORDER — ACETAMINOPHEN 650 MG/1
650 SUPPOSITORY RECTAL EVERY 6 HOURS PRN
Status: DISCONTINUED | OUTPATIENT
Start: 2020-12-25 | End: 2020-12-29 | Stop reason: HOSPADM

## 2020-12-25 RX ORDER — CHOLECALCIFEROL (VITAMIN D3) 50 MCG
2000 TABLET ORAL DAILY
Status: DISCONTINUED | OUTPATIENT
Start: 2020-12-25 | End: 2020-12-29 | Stop reason: HOSPADM

## 2020-12-25 RX ORDER — ACETAMINOPHEN 325 MG/1
650 TABLET ORAL EVERY 6 HOURS PRN
Status: DISCONTINUED | OUTPATIENT
Start: 2020-12-25 | End: 2020-12-29 | Stop reason: HOSPADM

## 2020-12-25 RX ORDER — AMLODIPINE BESYLATE 10 MG/1
10 TABLET ORAL DAILY
Status: DISCONTINUED | OUTPATIENT
Start: 2020-12-25 | End: 2020-12-29 | Stop reason: HOSPADM

## 2020-12-25 RX ORDER — CALCIUM POLYCARBOPHIL 625 MG 625 MG/1
625 TABLET ORAL DAILY
Status: DISCONTINUED | OUTPATIENT
Start: 2020-12-25 | End: 2020-12-29 | Stop reason: HOSPADM

## 2020-12-25 RX ORDER — DEXTROSE MONOHYDRATE 50 MG/ML
100 INJECTION, SOLUTION INTRAVENOUS PRN
Status: DISCONTINUED | OUTPATIENT
Start: 2020-12-25 | End: 2020-12-29 | Stop reason: HOSPADM

## 2020-12-25 RX ORDER — MEMANTINE HYDROCHLORIDE 5 MG/1
5 TABLET ORAL 2 TIMES DAILY
Status: DISCONTINUED | OUTPATIENT
Start: 2020-12-25 | End: 2020-12-29 | Stop reason: HOSPADM

## 2020-12-25 RX ORDER — ATORVASTATIN CALCIUM 20 MG/1
20 TABLET, FILM COATED ORAL ONCE
Status: COMPLETED | OUTPATIENT
Start: 2020-12-25 | End: 2020-12-25

## 2020-12-25 RX ORDER — LEVOTHYROXINE SODIUM 0.03 MG/1
25 TABLET ORAL DAILY
Status: DISCONTINUED | OUTPATIENT
Start: 2020-12-25 | End: 2020-12-29 | Stop reason: HOSPADM

## 2020-12-25 RX ORDER — SODIUM CHLORIDE 0.9 % (FLUSH) 0.9 %
10 SYRINGE (ML) INJECTION PRN
Status: DISCONTINUED | OUTPATIENT
Start: 2020-12-25 | End: 2020-12-29 | Stop reason: HOSPADM

## 2020-12-25 RX ORDER — PROMETHAZINE HYDROCHLORIDE 25 MG/1
12.5 TABLET ORAL EVERY 6 HOURS PRN
Status: DISCONTINUED | OUTPATIENT
Start: 2020-12-25 | End: 2020-12-29 | Stop reason: HOSPADM

## 2020-12-25 RX ORDER — NICOTINE POLACRILEX 4 MG
15 LOZENGE BUCCAL PRN
Status: DISCONTINUED | OUTPATIENT
Start: 2020-12-25 | End: 2020-12-29 | Stop reason: HOSPADM

## 2020-12-25 RX ORDER — ONDANSETRON 2 MG/ML
4 INJECTION INTRAMUSCULAR; INTRAVENOUS EVERY 6 HOURS PRN
Status: DISCONTINUED | OUTPATIENT
Start: 2020-12-25 | End: 2020-12-29 | Stop reason: HOSPADM

## 2020-12-25 RX ORDER — DEXTROSE MONOHYDRATE 25 G/50ML
12.5 INJECTION, SOLUTION INTRAVENOUS PRN
Status: DISCONTINUED | OUTPATIENT
Start: 2020-12-25 | End: 2020-12-29 | Stop reason: HOSPADM

## 2020-12-25 RX ORDER — POLYETHYLENE GLYCOL 3350 17 G/17G
17 POWDER, FOR SOLUTION ORAL DAILY PRN
Status: DISCONTINUED | OUTPATIENT
Start: 2020-12-25 | End: 2020-12-29 | Stop reason: HOSPADM

## 2020-12-25 RX ORDER — LANOLIN ALCOHOL/MO/W.PET/CERES
3 CREAM (GRAM) TOPICAL NIGHTLY
Status: DISCONTINUED | OUTPATIENT
Start: 2020-12-25 | End: 2020-12-29

## 2020-12-25 RX ORDER — SODIUM CHLORIDE 0.9 % (FLUSH) 0.9 %
10 SYRINGE (ML) INJECTION EVERY 12 HOURS SCHEDULED
Status: DISCONTINUED | OUTPATIENT
Start: 2020-12-25 | End: 2020-12-29 | Stop reason: HOSPADM

## 2020-12-25 RX ORDER — VILAZODONE HYDROCHLORIDE 40 MG/1
40 TABLET ORAL DAILY
Status: DISCONTINUED | OUTPATIENT
Start: 2020-12-25 | End: 2020-12-29 | Stop reason: HOSPADM

## 2020-12-25 RX ORDER — ASPIRIN 81 MG/1
81 TABLET ORAL DAILY
Status: DISCONTINUED | OUTPATIENT
Start: 2020-12-25 | End: 2020-12-29 | Stop reason: HOSPADM

## 2020-12-25 RX ADMIN — Medication 2000 UNITS: at 11:50

## 2020-12-25 RX ADMIN — Medication 1 TABLET: at 11:52

## 2020-12-25 RX ADMIN — AMLODIPINE BESYLATE 10 MG: 10 TABLET ORAL at 08:34

## 2020-12-25 RX ADMIN — Medication 3 MG: at 20:58

## 2020-12-25 RX ADMIN — DEXAMETHASONE 6 MG: 4 TABLET ORAL at 05:11

## 2020-12-25 RX ADMIN — RISPERIDONE 5 MG: 2 TABLET, FILM COATED ORAL at 21:18

## 2020-12-25 RX ADMIN — IOPAMIDOL 60 ML: 755 INJECTION, SOLUTION INTRAVENOUS at 00:15

## 2020-12-25 RX ADMIN — ENOXAPARIN SODIUM 30 MG: 30 INJECTION SUBCUTANEOUS at 20:58

## 2020-12-25 RX ADMIN — DOXYCYCLINE 100 MG: 100 INJECTION, POWDER, LYOPHILIZED, FOR SOLUTION INTRAVENOUS at 22:20

## 2020-12-25 RX ADMIN — ASPIRIN 81 MG: 81 TABLET, COATED ORAL at 11:50

## 2020-12-25 RX ADMIN — LEVOTHYROXINE SODIUM 25 MCG: 0.03 TABLET ORAL at 05:11

## 2020-12-25 RX ADMIN — ENOXAPARIN SODIUM 30 MG: 30 INJECTION SUBCUTANEOUS at 08:34

## 2020-12-25 RX ADMIN — INSULIN LISPRO 2 UNITS: 100 INJECTION, SOLUTION INTRAVENOUS; SUBCUTANEOUS at 21:14

## 2020-12-25 RX ADMIN — Medication 10 ML: at 00:15

## 2020-12-25 RX ADMIN — INSULIN LISPRO 5 UNITS: 100 INJECTION, SOLUTION INTRAVENOUS; SUBCUTANEOUS at 12:00

## 2020-12-25 RX ADMIN — DOXYCYCLINE 100 MG: 100 INJECTION, POWDER, LYOPHILIZED, FOR SOLUTION INTRAVENOUS at 12:10

## 2020-12-25 RX ADMIN — MEMANTINE HYDROCHLORIDE 5 MG: 5 TABLET, FILM COATED ORAL at 11:50

## 2020-12-25 RX ADMIN — Medication 10 ML: at 08:34

## 2020-12-25 RX ADMIN — CEFTRIAXONE SODIUM 1 G: 1 INJECTION, POWDER, FOR SOLUTION INTRAMUSCULAR; INTRAVENOUS at 22:16

## 2020-12-25 RX ADMIN — INSULIN LISPRO 4 UNITS: 100 INJECTION, SOLUTION INTRAVENOUS; SUBCUTANEOUS at 16:47

## 2020-12-25 RX ADMIN — DEXAMETHASONE SODIUM PHOSPHATE 10 MG: 10 INJECTION INTRAMUSCULAR; INTRAVENOUS at 01:14

## 2020-12-25 RX ADMIN — VILAZODONE HYDROCHLORIDE 40 MG: 40 TABLET ORAL at 11:51

## 2020-12-25 RX ADMIN — CALCIUM POLYCARBOPHIL 625 MG: 625 TABLET, FILM COATED ORAL at 11:51

## 2020-12-25 RX ADMIN — MEMANTINE HYDROCHLORIDE 5 MG: 5 TABLET, FILM COATED ORAL at 20:58

## 2020-12-25 RX ADMIN — ATORVASTATIN CALCIUM 20 MG: 20 TABLET, FILM COATED ORAL at 05:11

## 2020-12-25 RX ADMIN — Medication 10 ML: at 20:58

## 2020-12-25 RX ADMIN — INSULIN LISPRO 6 UNITS: 100 INJECTION, SOLUTION INTRAVENOUS; SUBCUTANEOUS at 09:00

## 2020-12-25 ASSESSMENT — PAIN SCALES - GENERAL
PAINLEVEL_OUTOF10: 0

## 2020-12-25 NOTE — ED NOTES
Zulema Shanicekartik, patient sister, would like called with updates; [phone] Hernán Sommer RN  12/24/20 8285

## 2020-12-25 NOTE — PLAN OF CARE
Problem: Skin Integrity:  Goal: Will show no infection signs and symptoms  Description: Will show no infection signs and symptoms  12/25/2020 1534 by Bisi Tong RN  Outcome: Met This Shift  12/25/2020 0409 by Sally Carnes RN  Outcome: Met This Shift  Goal: Absence of new skin breakdown  Description: Absence of new skin breakdown  12/25/2020 1534 by Bisi Tong RN  Outcome: Met This Shift  12/25/2020 0409 by Sally Carnes RN  Outcome: Met This Shift     Problem: Falls - Risk of:  Goal: Will remain free from falls  Description: Will remain free from falls  12/25/2020 1534 by Bisi Tong RN  Outcome: Met This Shift  12/25/2020 0409 by Sally Carnes RN  Outcome: Met This Shift  Goal: Absence of physical injury  Description: Absence of physical injury  Outcome: Met This Shift

## 2020-12-25 NOTE — ED NOTES
Bed: 07  Expected date:   Expected time:   Means of arrival:   Comments:  obinna Mabry RN  12/24/20 2112

## 2020-12-25 NOTE — H&P
Hospital Medicine History & Physical      PCP: Luis Alfredo Dillon MD    Date of Admission: 12/24/2020    Date of Service: Pt seen/examined on 12/25/20 and Admitted to Inpatient with expected LOS greater than two midnights due to medical therapy. Chief Complaint: Shortness of breath      History Of Present Illness:      80 y.o. female history of diabetes, hypertension who presented with worsening shortness of breath. She is a poor historian. Patient currently lives alone but has a helper. The patient has noticed progressive weakness and decreased appetite over the last few days. EMS was called and she was found to be hypoxic at 83% on room air. She was placed on 4 L with improvement of O2 sats to 95%. Patient denies any sick contacts. In ER, she was afebrile but hypertensive. Lab work was pertinent for calcium 10.4, and bland UA. Rapid COVID-19 was negative. Chest x-ray showed patchy multifocal infiltrates concerning for pneumonia. CTA chest showed no evidence of PE but multifocal airspace disease concerning for COVID-19. Past Medical History:          Diagnosis Date    Diabetes mellitus ([facility] [address]     Hyperlipidemia     Hypertension     Thyroid disease        Past Surgical History:          Procedure Laterality Date    DILATION AND CURETTAGE OF UTERUS      THYROIDECTOMY         Medications Prior to Admission:      Prior to Admission medications    Medication Sig Start Date End Date Taking?  Authorizing Provider   linaclotide Zachsorin Manning) 145 MCG capsule Take 1 capsule by mouth every morning (before breakfast) 4/27/20   Luis Alfredo Dillon MD   memantine (NAMENDA) 5 MG tablet Take 5 mg by mouth 2 times daily 1 in am, 1 at hs    Historical Provider, MD   VILAZODONE HCL 40 MG Tablet Take 1 tablet by mouth daily At noon 4/27/20   Luis Alfredo Dillon MD   risperiDONE (RISPERDAL) 1 MG tablet Take 5 tablets by mouth nightly 4/27/20   Luis Alfredo Dillon MD   melatonin (RA MELATONIN) 3 MG TABS tablet Take 1 tablet by mouth nightly 4/27/20 5/27/20  Colleen Moreno MD   Multiple Vitamins-Minerals (CENTRUM SILVER ADULT 50+) TABS Take 1 tablet by mouth daily    Historical Provider, MD   polycarbophil (FIBERCON) 625 MG tablet Take 625 mg by mouth daily    Historical Provider, MD   Cyanocobalamin (B-12 PO) Take 1 tablet by mouth daily    Historical Provider, MD   aspirin 81 MG EC tablet Take 1 tablet by mouth daily 8/3/15   Colleen Moreno MD   sitaGLIPtin (JANUVIA) 50 MG tablet Take 1 tablet by mouth daily  Patient taking differently: Take 100 mg by mouth daily  8/3/15   Colleen Moreno MD   atorvastatin (LIPITOR) 20 MG tablet Take 1 tablet by mouth once for 1 dose 8/3/15 5/11/19  Colleen Moreno MD   amLODIPine (NORVASC) 10 MG tablet Take 10 mg by mouth daily    Historical Provider, MD   levothyroxine (SYNTHROID) 25 MCG tablet Take 25 mcg by mouth Daily    Historical Provider, MD       Allergies:  Patient has no known allergies. Social History:      The patient currently lives at home    TOBACCO:   reports that she has never smoked. She has never used smokeless tobacco.  ETOH:   reports no history of alcohol use. E-Cigarettes/Vaping Use     Questions Responses    E-Cigarette/Vaping Use     Start Date     Passive Exposure     Quit Date     Counseling Given     Comments             Family History:      Reviewed in detail and negative for DM, CAD, Cancer, CVA. Positive as follows:    No family history on file. REVIEW OF SYSTEMS:   Pertinent positives as noted in the HPI. All other systems reviewed and negative. PHYSICAL EXAM PERFORMED:    /64   Pulse 80   Temp 98.9 °F (37.2 °C)   Resp 20   LMP 08/01/2015   SpO2 95%     General appearance:  No apparent distress, appears stated age. Elderly frail female  HEENT:  Normal cephalic, atraumatic without obvious deformity. Pupils equal, round, and reactive to light. Extra ocular muscles intact. Conjunctivae/corneas clear.   Neck: Supple, with full range of motion. No jugular venous distention. Trachea midline. Respiratory:  Normal respiratory effort. Rhonchi at bases  Cardiovascular:  Regular rate and rhythm with normal S1/S2 without murmurs, rubs or gallops. Abdomen: Soft, non-tender, non-distended with normal bowel sounds. Musculoskeletal:  No clubbing, cyanosis or edema bilaterally. Full range of motion without deformity. Skin: Anterior chest confluent brown-colored rash  Neurologic:  Neurovascularly intact without any focal sensory/motor deficits. Cranial nerves: II-XII intact, grossly non-focal.  Psychiatric:  Alert and oriented, thought content appropriate, normal insight      Labs:     Recent Labs     12/24/20 2137   WBC 11.3   HGB 11.8   HCT 34.3        Recent Labs     12/24/20 2137      K 3.7      CO2 23   BUN 16   CREATININE 0.7   CALCIUM 10.4*     Recent Labs     12/24/20 2137   AST 32*   ALT 21   BILITOT 0.5   ALKPHOS 56     No results for input(s): INR in the last 72 hours. Recent Labs     12/24/20 2137   TROPONINI <0.01       Urinalysis:      Lab Results   Component Value Date    NITRU Negative 12/24/2020    WBCUA 0-1 12/24/2020    BACTERIA RARE 12/24/2020    RBCUA 0-1 12/24/2020    BLOODU Negative 12/24/2020    SPECGRAV >=1.030 12/24/2020    GLUCOSEU 250 12/24/2020       Radiology:     EKG: Normal sinus rhythm at 90 bpm    CTA CHEST W CONTRAST   Final Result   No evidence of pulmonary emboli. Multifocal airspace disease in the lungs bilaterally, consistent with   multifocal pneumonia and suspicious for COVID-19 pneumonia. Nonspecific small hilar and mediastinal nodes, which may be reactive. Mild cardiomegaly with coronary atherosclerotic calcifications. XR CHEST PORTABLE   Final Result   Patchy multifocal infiltrates concerning for pneumonia. ASSESSMENT:    There are no active hospital problems to display for this patient.     Acute respiratory hypoxic failure-due to multifocal pneumonia  -Currently using 4 L nasal cannula. Wean oxygen for O2 sats greater than 90%    Multifocal pneumonia-suspect covid 19 pneumonia despite negative rapid test  -Repeat Covid PCR  -Start dexamethasone  -Start remdesivir  -Check procalcitonin, continue ceftriaxone and doxycycline for now  -Check urine Legionella and Streptococcus antigen  -Trend inflammatory markers    Hypertension  -BP fluctuating, resume home medications    DM II  -Start insulin coverage and DC oral diabetic medications    History of dementia  -Continue Namenda and Risperdal    History of hypothyroidism  -Continue levothyroxine    History of meningioma  -    DVT Prophylaxis: Lovenox for covid 19 negative  Diet: No diet orders on file  Code Status: Prior    PT/OT Eval Status: Ordered    Dispo -possibly home with PT versus rehab in 3 to 5 days       Luis Deleon MD    Thank you Luis Alfredo Dillon MD for the opportunity to be involved in this patient's care. If you have any questions or concerns please feel free to contact me at [phone].

## 2020-12-25 NOTE — ED PROVIDER NOTES
The patient is a 19-year-old female who presents to the emergency department via EMS from home complaining of fatigue and weakness. Symptoms are sudden onset, constant, and moderate in severity. Nothing makes it better or worse. The patient has been experiencing decreased appetite over the past several days. She was also found to be 83% on room air for EMS. She was placed on 4 L nasal cannula and improved to 95%. Patient states she is also experiencing a dry cough. The patient denies any fever, chills, Covid exposure, chest pain, shortness of breath, vomiting, diarrhea, abdominal pain, recent hospitalization, recent illness, or other acute symptoms or concerns. The history is provided by the patient and the EMS personnel. Review of Systems   Constitutional: Positive for appetite change and fatigue. Negative for chills and fever. HENT: Negative for congestion and trouble swallowing. Eyes: Negative for photophobia and visual disturbance. Respiratory: Positive for cough. Negative for shortness of breath. Cardiovascular: Negative for chest pain and leg swelling. Gastrointestinal: Negative for abdominal pain, diarrhea, nausea and vomiting. Genitourinary: Negative for dysuria, flank pain, frequency and hematuria. Musculoskeletal: Negative for back pain and neck pain. Skin: Negative for rash and wound. Neurological: Positive for weakness. Negative for dizziness, syncope, light-headedness, numbness and headaches. All other systems reviewed and are negative. Physical Exam  Vitals signs and nursing note reviewed. Constitutional:       General: She is in acute distress. Appearance: She is underweight. She is ill-appearing. She is not toxic-appearing or diaphoretic. Comments: The patient is chronically ill-appearing, but in no acute distress   HENT:      Head: Normocephalic and atraumatic. Nose: Nose normal.      Mouth/Throat:      Lips: Pink. No lesions. pharmacy was consulted to dose remdesivir. Consulted with hospitalist who accepted patient for admission. Discussed results and plan of care with patient and she verbalized understanding and agreement to treatment plan and admission. She remained in the 90s on 3 to 4 L nasal cannula. She was otherwise hemodynamically stable. ED Course as of Dec 25 0221   Fri Dec 25, 2020   0110 Evaluated patient. She is resting comfortably and currently on 3 L nasal cannula. [KG]   0159 Consulted with hospitalist, who accepted for admission. [KG]      ED Course User Index  [KG] Maryanne Ott DO          EKG: This EKG is signed and interpreted by me. Rate: 90  Rhythm: Sinus  Interpretation: Normal sinus rhythm, left axis deviation, no acute ST elevation or depressions, intervals within normal limits, QTC is 442  Comparison: stable as compared to patient's most recent EKG          ED Course as of Dec 25 0221   Fri Dec 25, 2020   0110 Evaluated patient. She is resting comfortably and currently on 3 L nasal cannula. [KG]   0159 Consulted with hospitalist, who accepted for admission. [KG]      ED Course User Index  [KG] Maryanne Ott DO       --------------------------------------------- PAST HISTORY ---------------------------------------------  Past Medical History:  has a past medical history of Diabetes mellitus ([facility] [address] Hyperlipidemia, Hypertension, and Thyroid disease. Past Surgical History:  has a past surgical history that includes Thyroidectomy and Dilation and curettage of uterus. Social History:  reports that she has never smoked. She has never used smokeless tobacco. She reports that she does not drink alcohol or use drugs. Family History: family history is not on file. The patients home medications have been reviewed. Allergies: Patient has no known allergies.     -------------------------------------------------- RESULTS -------------------------------------------------    LABS:  Results for orders placed or performed during the hospital encounter of 12/24/20   CBC auto differential   Result Value Ref Range    WBC 11.3 4.5 - 11.5 E9/L    RBC 4.02 3.50 - 5.50 E12/L    Hemoglobin 11.8 11.5 - 15.5 g/dL    Hematocrit 34.3 34.0 - 48.0 %    MCV 85.3 80.0 - 99.9 fL    MCH 29.4 26.0 - 35.0 pg    MCHC 34.4 32.0 - 34.5 %    RDW 12.9 11.5 - 15.0 fL    Platelets 607 511 - 164 E9/L    MPV 10.0 7.0 - 12.0 fL    Neutrophils % 79.3 43.0 - 80.0 %    Immature Granulocytes % 2.0 0.0 - 5.0 %    Lymphocytes % 8.5 (L) 20.0 - 42.0 %    Monocytes % 9.3 2.0 - 12.0 %    Eosinophils % 0.5 0.0 - 6.0 %    Basophils % 0.4 0.0 - 2.0 %    Neutrophils Absolute 8.99 (H) 1.80 - 7.30 E9/L    Immature Granulocytes # 0.23 E9/L    Lymphocytes Absolute 0.96 (L) 1.50 - 4.00 E9/L    Monocytes Absolute 1.06 (H) 0.10 - 0.95 E9/L    Eosinophils Absolute 0.06 0.05 - 0.50 E9/L    Basophils Absolute 0.04 0.00 - 0.20 E9/L   Comprehensive Metabolic Panel w/ Reflex to MG   Result Value Ref Range    Sodium 133 132 - 146 mmol/L    Potassium reflex Magnesium 3.7 3.5 - 5.0 mmol/L    Chloride 100 98 - 107 mmol/L    CO2 23 22 - 29 mmol/L    Anion Gap 10 7 - 16 mmol/L    Glucose 324 (H) 74 - 99 mg/dL    BUN 16 8 - 23 mg/dL    CREATININE 0.7 0.5 - 1.0 mg/dL    GFR Non-African American >60 >=60 mL/min/1.73    GFR African American >60     Calcium 10.4 (H) 8.6 - 10.2 mg/dL    Total Protein 6.5 6.4 - 8.3 g/dL    Alb 3.2 (L) 3.5 - 5.2 g/dL    Total Bilirubin 0.5 0.0 - 1.2 mg/dL    Alkaline Phosphatase 56 35 - 104 U/L    ALT 21 0 - 32 U/L    AST 32 (H) 0 - 31 U/L   Troponin   Result Value Ref Range    Troponin <0.01 0.00 - 0.03 ng/mL   Urinalysis with Microscopic   Result Value Ref Range    Color, UA Yellow Straw/Yellow    Clarity, UA Clear Clear    Glucose, Ur 250 (A) Negative mg/dL    Bilirubin Urine Negative Negative    Ketones, Urine Negative Negative mg/dL    Specific Gravity, UA >=1.030 1.005 - 1.030    Blood, Urine Negative Negative    pH, UA 6.0 5.0 - 9.0    Protein, UA 30 (A) Negative mg/dL    Urobilinogen, Urine 0.2 <2.0 E.U./dL    Nitrite, Urine Negative Negative    Leukocyte Esterase, Urine Negative Negative    WBC, UA 0-1 0 - 5 /HPF    RBC, UA 0-1 0 - 2 /HPF    Bacteria, UA RARE (A) None Seen /HPF   TSH without Reflex   Result Value Ref Range    TSH 2.500 0.270 - 4.200 uIU/mL   COVID-19   Result Value Ref Range    SARS-CoV-2, NAAT Not Detected Not Detected   LACTIC ACID, PLASMA   Result Value Ref Range    Lactic Acid 2.0 0.5 - 2.2 mmol/L       RADIOLOGY:  CTA CHEST W CONTRAST   Final Result   No evidence of pulmonary emboli. Multifocal airspace disease in the lungs bilaterally, consistent with   multifocal pneumonia and suspicious for COVID-19 pneumonia. Nonspecific small hilar and mediastinal nodes, which may be reactive. Mild cardiomegaly with coronary atherosclerotic calcifications. XR CHEST PORTABLE   Final Result   Patchy multifocal infiltrates concerning for pneumonia.            ------------------------- NURSING NOTES AND VITALS REVIEWED ---------------------------  Date / Time Roomed:  12/24/2020  9:13 PM  ED Bed Assignment:  07/07    The nursing notes within the ED encounter and vital signs as below have been reviewed.      Patient Vitals for the past 24 hrs:   BP Temp Temp src Pulse Resp SpO2   12/25/20 0111 139/64 98.9 °F (37.2 °C) -- 80 20 95 %   12/24/20 2230 (!) 145/56 -- -- (!) 46 20 98 %   12/24/20 2145 (!) 154/61 -- -- 85 18 96 %   12/24/20 2114 (!) 146/58 97.7 °F (36.5 °C) Infrared 90 20 94 %       Oxygen Saturation Interpretation: Normal    ------------------------------------------ PROGRESS NOTES ------------------------------------------  Re-evaluation(s): Patients symptoms show no change  Repeat physical examination is not changed    Counseling:  I have spoken with the patient and discussed todays results, in addition to providing specific details for the plan of care and counseling regarding the diagnosis and prognosis. Their questions are answered at this time and they are agreeable with the plan of admission.    --------------------------------- ADDITIONAL PROVIDER NOTES ---------------------------------  Consultations: Spoke with Dr. Olivia Wall. Discussed case. They will admit the patient. This patient's ED course included: a personal history and physicial examination, re-evaluation prior to disposition, multiple bedside re-evaluations, IV medications, cardiac monitoring, continuous pulse oximetry and complex medical decision making and emergency management    This patient has remained hemodynamically stable during their ED course. Diagnosis:  1. Acute respiratory failure with hypoxia ([address]    2. Pneumonia due to organism    3. COVID-19        Disposition:  Patient's disposition: Admit to telemetry  Patient's condition is stable. Ar Garrido DO  Resident  12/25/20 0716      ATTENDING PROVIDER ATTESTATION:     I have personally performed and/or participated in the history, exam, medical decision making, and procedures and agree with all pertinent clinical information. I have also reviewed and agree with the past medical, family and social history unless otherwise noted. I have discussed this patient in detail with the resident, and provided the instruction and education regarding weakness. My findings/Plan: I was the primary provider for patient. Patient presenting here because of weakness for the past week. Patient reporting some cough there is no history of fever. Patient has known known exposure to Covid. Patient reporting no shortness of breath or vomiting or diarrhea. Patient was noted to be hypoxic per EMS patient's pulse ox is 83%. Patient awake alert oriented heart lung exam normal abdomen is soft and nontender she is able to move all extremities there is no edema. Labs and chest x-ray noted and reviewed.   Covid testing was negative CT of the chest was done due to hypoxia as well as Covid testing being negative. EKG was noted and reviewed patient CT does show suspected Covid and has a pattern for Covid viral pneumonia. Patient was given IV antibiotics here she was also ordered IV Decadron. Patient was made aware of findings and plan. Vital signs remained stable here in the emergency department. We did speak to on-call internal medicine. Patient will be admitted to monitored bed.        Sheryle Bison, MD  12/25/20 9802       Sheryle Bison, MD  12/25/20 0050

## 2020-12-25 NOTE — ED NOTES
Per Lissett in CT, 20g in [address] [place] [address] will be ok for CTA     Nathalie Austin RN  12/24/20 1363

## 2020-12-26 LAB
METER GLUCOSE: 288 MG/DL (ref 74–99)
METER GLUCOSE: 309 MG/DL (ref 74–99)
METER GLUCOSE: 311 MG/DL (ref 74–99)
METER GLUCOSE: 329 MG/DL (ref 74–99)

## 2020-12-26 PROCEDURE — 82962 GLUCOSE BLOOD TEST: CPT

## 2020-12-26 PROCEDURE — 2060000000 HC ICU INTERMEDIATE R&B

## 2020-12-26 PROCEDURE — 6360000002 HC RX W HCPCS: Performed by: INTERNAL MEDICINE

## 2020-12-26 PROCEDURE — 2580000003 HC RX 258: Performed by: INTERNAL MEDICINE

## 2020-12-26 PROCEDURE — 2500000003 HC RX 250 WO HCPCS: Performed by: INTERNAL MEDICINE

## 2020-12-26 PROCEDURE — 6370000000 HC RX 637 (ALT 250 FOR IP): Performed by: INTERNAL MEDICINE

## 2020-12-26 PROCEDURE — 2700000000 HC OXYGEN THERAPY PER DAY

## 2020-12-26 RX ORDER — 0.9 % SODIUM CHLORIDE 0.9 %
30 INTRAVENOUS SOLUTION INTRAVENOUS PRN
Status: DISCONTINUED | OUTPATIENT
Start: 2020-12-26 | End: 2020-12-29 | Stop reason: HOSPADM

## 2020-12-26 RX ORDER — INSULIN GLARGINE 100 [IU]/ML
10 INJECTION, SOLUTION SUBCUTANEOUS DAILY
Status: DISCONTINUED | OUTPATIENT
Start: 2020-12-26 | End: 2020-12-29 | Stop reason: HOSPADM

## 2020-12-26 RX ORDER — PETROLATUM 42 G/100G
OINTMENT TOPICAL 2 TIMES DAILY PRN
Status: DISCONTINUED | OUTPATIENT
Start: 2020-12-26 | End: 2020-12-29 | Stop reason: HOSPADM

## 2020-12-26 RX ADMIN — CEFTRIAXONE SODIUM 1 G: 1 INJECTION, POWDER, FOR SOLUTION INTRAMUSCULAR; INTRAVENOUS at 22:52

## 2020-12-26 RX ADMIN — MEMANTINE HYDROCHLORIDE 5 MG: 5 TABLET, FILM COATED ORAL at 20:35

## 2020-12-26 RX ADMIN — Medication 10 ML: at 11:00

## 2020-12-26 RX ADMIN — LEVOTHYROXINE SODIUM 25 MCG: 0.03 TABLET ORAL at 06:29

## 2020-12-26 RX ADMIN — Medication 2000 UNITS: at 09:34

## 2020-12-26 RX ADMIN — Medication 10 ML: at 20:37

## 2020-12-26 RX ADMIN — DOXYCYCLINE 100 MG: 100 INJECTION, POWDER, LYOPHILIZED, FOR SOLUTION INTRAVENOUS at 22:52

## 2020-12-26 RX ADMIN — ASPIRIN 81 MG: 81 TABLET, COATED ORAL at 09:34

## 2020-12-26 RX ADMIN — INSULIN LISPRO 6 UNITS: 100 INJECTION, SOLUTION INTRAVENOUS; SUBCUTANEOUS at 20:40

## 2020-12-26 RX ADMIN — CALCIUM POLYCARBOPHIL 625 MG: 625 TABLET, FILM COATED ORAL at 09:33

## 2020-12-26 RX ADMIN — INSULIN LISPRO 12 UNITS: 100 INJECTION, SOLUTION INTRAVENOUS; SUBCUTANEOUS at 17:17

## 2020-12-26 RX ADMIN — DOXYCYCLINE 100 MG: 100 INJECTION, POWDER, LYOPHILIZED, FOR SOLUTION INTRAVENOUS at 11:11

## 2020-12-26 RX ADMIN — ACETAMINOPHEN 650 MG: 325 TABLET ORAL at 20:54

## 2020-12-26 RX ADMIN — INSULIN GLARGINE 10 UNITS: 100 INJECTION, SOLUTION SUBCUTANEOUS at 12:09

## 2020-12-26 RX ADMIN — Medication 1 TABLET: at 09:34

## 2020-12-26 RX ADMIN — AMLODIPINE BESYLATE 10 MG: 10 TABLET ORAL at 09:33

## 2020-12-26 RX ADMIN — MEMANTINE HYDROCHLORIDE 5 MG: 5 TABLET, FILM COATED ORAL at 09:34

## 2020-12-26 RX ADMIN — INSULIN LISPRO 12 UNITS: 100 INJECTION, SOLUTION INTRAVENOUS; SUBCUTANEOUS at 12:03

## 2020-12-26 RX ADMIN — RISPERIDONE 5 MG: 2 TABLET, FILM COATED ORAL at 20:35

## 2020-12-26 RX ADMIN — ENOXAPARIN SODIUM 30 MG: 30 INJECTION SUBCUTANEOUS at 20:36

## 2020-12-26 RX ADMIN — DEXAMETHASONE 6 MG: 4 TABLET ORAL at 09:33

## 2020-12-26 RX ADMIN — REMDESIVIR 200 MG: 100 INJECTION, POWDER, LYOPHILIZED, FOR SOLUTION INTRAVENOUS at 16:45

## 2020-12-26 RX ADMIN — Medication 3 MG: at 20:35

## 2020-12-26 RX ADMIN — VILAZODONE HYDROCHLORIDE 40 MG: 40 TABLET ORAL at 09:34

## 2020-12-26 RX ADMIN — INSULIN LISPRO 3 UNITS: 100 INJECTION, SOLUTION INTRAVENOUS; SUBCUTANEOUS at 06:01

## 2020-12-26 RX ADMIN — ENOXAPARIN SODIUM 30 MG: 30 INJECTION SUBCUTANEOUS at 09:34

## 2020-12-26 NOTE — PLAN OF CARE
Problem: Skin Integrity:  Goal: Will show no infection signs and symptoms  Description: Will show no infection signs and symptoms  12/25/2020 2148 by Sheila Cho  Outcome: Met This Shift  12/25/2020 1534 by Jessica Natarajan RN  Outcome: Met This Shift  Goal: Absence of new skin breakdown  Description: Absence of new skin breakdown  12/25/2020 2148 by Sheila Cho  Outcome: Met This Shift  12/25/2020 1534 by Jessica Natarajan RN  Outcome: Met This Shift     Problem: Falls - Risk of:  Goal: Will remain free from falls  Description: Will remain free from falls  12/25/2020 2148 by Sheila Mendenhall  Outcome: Met This Shift  12/25/2020 1534 by Jessica Natarajan RN  Outcome: Met This Shift  Goal: Absence of physical injury  Description: Absence of physical injury  12/25/2020 1534 by Jessica Natarajan RN  Outcome: Met This Shift

## 2020-12-26 NOTE — CONSULTS
[address] [place] [address] [facility]    Consultation Note     Admit Date: 12/24/2020  9:13 PM    Reason for Consult:    Possible COVID 23    Attending Physician:  Danitza Braswell MD     Chief Complaint:  She really had no complaint for me    HISTORY OF PRESENT ILLNESS:   The patient is a 80 y.o.  female known to the Infectious Diseases service. The patient has the below past med hx. She does not look her stated age and I spent time talking to her. ! Admitted with SOBI and CT lung suspicious for covid 19   She is on remdisivir and dexamethasone per dr. Vanessa Adame. CT scan showing multifocal airspace disease in the lungs bilaterally consisgtentj with multifocal pneumonia and suspicious for COVID 19 pneumona.        Past Medical History:        Diagnosis Date    Diabetes mellitus ([address]     Hyperlipidemia     Hypertension     Thyroid disease      Past Surgical History:        Procedure Laterality Date    DILATION AND CURETTAGE OF UTERUS      THYROIDECTOMY       Current Medications:   Scheduled Meds:   insulin lispro  0-18 Units Subcutaneous TID WC    insulin lispro  0-9 Units Subcutaneous Nightly    insulin glargine  10 Units Subcutaneous Daily    [START ON 12/27/2020] remdesivir IVPB  100 mg Intravenous Q24H    amLODIPine  10 mg Oral Daily    aspirin  81 mg Oral Daily    levothyroxine  25 mcg Oral Daily    linaclotide  145 mcg Oral QAM AC    melatonin  3 mg Oral Nightly    memantine  5 mg Oral BID    therapeutic multivitamin-minerals  1 tablet Oral Daily    polycarbophil  625 mg Oral Daily    risperiDONE  5 mg Oral Nightly    vilazodone HCl  40 mg Oral Daily    sodium chloride flush  10 mL Intravenous 2 times per day    enoxaparin  30 mg Subcutaneous BID    cefTRIAXone (ROCEPHIN) IV  1 g Intravenous Q24H    And    doxycycline (VIBRAMYCIN) IV  100 mg Intravenous Q12H    dexamethasone  6 mg Oral Daily    Vitamin D  2,000 Units Oral Daily     Continuous Infusions:   dextrose       PRN Meds:mineral oil-hydrophilic petrolatum, sodium chloride, sodium chloride flush, promethazine **OR** ondansetron, polyethylene glycol, acetaminophen **OR** acetaminophen, glucose, dextrose, glucagon (rDNA), dextrose    Allergies:  Patient has no known allergies. Social History:   Social History     Socioeconomic History    Marital status:      Spouse name: Not on file    Number of children: Not on file    Years of education: Not on file    Highest education level: Not on file   Occupational History    Not on file   Social Needs    Financial resource strain: Not on file    Food insecurity     Worry: Not on file     Inability: Not on file    Transportation needs     Medical: Not on file     Non-medical: Not on file   Tobacco Use    Smoking status: Never Smoker    Smokeless tobacco: Never Used   Substance and Sexual Activity    Alcohol use: No    Drug use: No    Sexual activity: Not on file   Lifestyle    Physical activity     Days per week: Not on file     Minutes per session: Not on file    Stress: Not on file   Relationships    Social connections     Talks on phone: Not on file     Gets together: Not on file     Attends Zoroastrianism service: Not on file     Active member of club or organization: Not on file     Attends meetings of clubs or organizations: Not on file     Relationship status: Not on file    Intimate partner violence     Fear of current or ex partner: Not on file     Emotionally abused: Not on file     Physically abused: Not on file     Forced sexual activity: Not on file   Other Topics Concern    Not on file   Social History Narrative    Not on file     Tobacco: No  Alcohol: No  Pets: No  Travel: No    Family History:   No family history on file. . Otherwise non-pertinent to the chief complaint. REVIEW OF SYSTEMS:    CONSTITUTIONAL:  No chills, fevers or night sweats. No loss of weight. EYES:  No double vision or drainage from eyes, ears or throat.   HEENT:  No neck stiffness. No dysphagia. No drainage from eyes, ears or throat  RESPIRATORY:  No cough, productive sputum or hemoptysis. CARDIOVASCULAR:  No chest pain, palpitations, orthopnea or dyspnea on exertion. GASTROINTESTINAL:  No nausea, vomiting, diarrhea or constipation or hematochezia   GENITOURINARY:  No frequency burning dysuria or hematuria. INTEGUMENT/BREAST:  No rash or breast masses. HEMATOLOGIC/LYMPHATIC:  No lymphadenopathy or blood dyscrasics. ALLERGIC/IMMUNOLOGIC:  No anaphylaxis. ENDOCRINE:  No polyuria or polydipsia or temperature intolerance. MUSCULOSKELETAL:  No myalgia or arthralgia. Full ROM. NEUROLOGICAL:  No focal motor sensory deficit. BEHAVIOR/PSYCH:  No psychosis. PHYSICAL EXAM:    Vitals:    BP (!) 147/64   Pulse 88   Temp 97 °F (36.1 °C) (Temporal)   Resp 20   Ht 5' 3\" (1.6 m)   Wt 120 lb 1 oz (54.5 kg)   LMP 08/01/2015   SpO2 94%   BMI 21.27 kg/m²   Constitutional: The patient is awake, alert, and oriented. Skin: Warm and dry. No rashes were noted. HEENT: Eyes show round, and reactive pupils. No jaundice. Moist mucous membranes, no ulcerations, no thrush. Neck: Supple to movements. No lymphadenopathy. Chest: No use of accessory muscles to breathe. Symmetrical expansion. Auscultation reveals no wheezing, crackles, or rhonchi. Cardiovascular: S1 and S2 are rhythmic and regular. No murmurs appreciated. Abdomen: Positive bowel sounds to auscultation. Benign to palpation. No masses felt. No hepatosplenomegaly. Extremities: No clubbing, no cyanosis, no edema.   Lines: peripheral      CBC+dif:  Recent Labs     12/24/20  2137 12/25/20  1145   WBC 11.3 8.5   HGB 11.8 12.9   HCT 34.3 38.0   MCV 85.3 86.6    298   NEUTROABS 8.99*  --      No results found for: CRP  No results found for: CRPHS  No results found for: SEDRATE  Lab Results   Component Value Date    ALT 24 12/25/2020    AST 34 (H) 12/25/2020    ALKPHOS 58 12/25/2020    BILITOT 0.5 12/25/2020     Lab Results   Component Value Date     12/25/2020    K 3.9 12/25/2020     12/25/2020    CO2 22 12/25/2020    BUN 15 12/25/2020    CREATININE 0.7 12/25/2020    GFRAA >60 12/25/2020    LABGLOM >60 12/25/2020    GLUCOSE 386 12/25/2020    PROT 6.9 12/25/2020    LABALBU 3.2 12/25/2020    CALCIUM 9.7 12/25/2020    BILITOT 0.5 12/25/2020    ALKPHOS 58 12/25/2020    AST 34 12/25/2020    ALT 24 12/25/2020       No results found for: PROTIME, INR    Lab Results   Component Value Date    TSH 2.500 12/24/2020       Lab Results   Component Value Date    COLORU Yellow 12/24/2020    PHUR 6.0 12/24/2020    WBCUA 0-1 12/24/2020    RBCUA 0-1 12/24/2020    BACTERIA RARE 12/24/2020    CLARITYU Clear 12/24/2020    SPECGRAV >=1.030 12/24/2020    LEUKOCYTESUR Negative 12/24/2020    UROBILINOGEN 0.2 12/24/2020    BILIRUBINUR Negative 12/24/2020    BLOODU Negative 12/24/2020    GLUCOSEU 250 12/24/2020       No results found for: [place], BEART, M1RAWOMC, PHART, THGBART, JAE8CSF, PO2ART, [place]  Radiology:  CTA CHEST W CONTRAST   Final Result   No evidence of pulmonary emboli. Multifocal airspace disease in the lungs bilaterally, consistent with   multifocal pneumonia and suspicious for COVID-19 pneumonia. Nonspecific small hilar and mediastinal nodes, which may be reactive. Mild cardiomegaly with coronary atherosclerotic calcifications. XR CHEST PORTABLE   Final Result   Patchy multifocal infiltrates concerning for pneumonia. Microbiology:  Pending  Recent Labs     12/24/20  2252   BC 24 Hours no growth     No results for input(s): ORG in the last 72 hours. Recent Labs     12/24/20  2252   BLOODCULT2 24 Hours no growth     No results for input(s): STREPNEUMAGU in the last 72 hours. No results for input(s): LP1UAG in the last 72 hours. No results for input(s): ASO in the last 72 hours. No results for input(s): CULTRESP in the last 72 hours.     Assessment:  · COVID 19 suspicion  · Agree with remdisivir and dexamethasone    · She was delightful !!!  · Pulse ox 94 on 3 liters  · Afebrile     Plan:    · Cont rem and dexamethasone  · Agree with ceftriaxone as you are doing   · Check cultures  · Baseline ESR, CRP  · Monitor labs  · Will follow with you  · Urine antigens are pending     Thank you for having us see this patient in consultation. I will be discussing this case with the treating physicians.       Electronically signed by Jong Starks MD on 12/26/2020 at 6:35 PM

## 2020-12-26 NOTE — PROGRESS NOTES
times per day    enoxaparin  30 mg Subcutaneous BID    cefTRIAXone (ROCEPHIN) IV  1 g Intravenous Q24H    And    doxycycline (VIBRAMYCIN) IV  100 mg Intravenous Q12H    dexamethasone  6 mg Oral Daily    Vitamin D  2,000 Units Oral Daily     PRN Meds: mineral oil-hydrophilic petrolatum, sodium chloride flush, promethazine **OR** ondansetron, polyethylene glycol, acetaminophen **OR** acetaminophen, glucose, dextrose, glucagon (rDNA), dextrose    I/O    Intake/Output Summary (Last 24 hours) at 12/26/2020 1047  Last data filed at 12/26/2020 0606  Gross per 24 hour   Intake 1030 ml   Output 300 ml   Net 730 ml       Labs:   Recent Labs     12/24/20 2137 12/25/20  1145   WBC 11.3 8.5   HGB 11.8 12.9   HCT 34.3 38.0    298       Recent Labs     12/24/20 2137 12/25/20  1145    133   K 3.7 3.9    100   CO2 23 22   BUN 16 15   CREATININE 0.7 0.7   CALCIUM 10.4* 9.7       Recent Labs     12/24/20 2137 12/25/20  1145   PROT 6.5 6.9   ALKPHOS 56 58   ALT 21 24   AST 32* 34*   BILITOT 0.5 0.5       No results for input(s): INR in the last 72 hours. Recent Labs     12/24/20 2137   TROPONINI <0.01       Chronic labs:  Lab Results   Component Value Date    CHOL 160 08/03/2015    TRIG 190 (H) 08/03/2015    HDL 49 08/03/2015    LDLCALC 73 08/03/2015    TSH 2.500 12/24/2020    LABA1C 8.0 (H) 04/25/2020       Radiology:  Imaging studies reviewed today.     ASSESSMENT:  Acute hypoxic respiratory failure  Suspected COVID-19 pneumonia  Less likely bacterial multifocal pneumonia   DM2     PLAN:  Encourage po intake  Wean oxygen as tolerated  Glycemic control  Pharmacy consulted for remdesivir   Continue for empiric antibiotics     Diet: DIET CARB CONTROL;  Code Status: Full Code  PT/OT Eval Status:   ordered  DVT Prophylaxis:   lovenox  Recommended disposition at discharge:  home w [facility] [place]     +++++++++++++++++++++++++++++++++++++++++++++++++  Andres Mcdonough MD   [facility].  +++++++++++++++++++++++++++++++++++++++++++++++++  NOTE: This report was transcribed using voice recognition software. Every effort was made to ensure accuracy; however, inadvertent computerized transcription errors may be present.

## 2020-12-27 LAB
ALBUMIN SERPL-MCNC: 3.2 G/DL (ref 3.5–5.2)
ALP BLD-CCNC: 59 U/L (ref 35–104)
ALT SERPL-CCNC: 36 U/L (ref 0–32)
ANION GAP SERPL CALCULATED.3IONS-SCNC: 14 MMOL/L (ref 7–16)
AST SERPL-CCNC: 51 U/L (ref 0–31)
BILIRUB SERPL-MCNC: 0.4 MG/DL (ref 0–1.2)
BUN BLDV-MCNC: 14 MG/DL (ref 8–23)
CALCIUM SERPL-MCNC: 9.6 MG/DL (ref 8.6–10.2)
CHLORIDE BLD-SCNC: 97 MMOL/L (ref 98–107)
CO2: 22 MMOL/L (ref 22–29)
CREAT SERPL-MCNC: 0.5 MG/DL (ref 0.5–1)
GFR AFRICAN AMERICAN: >60
GFR NON-AFRICAN AMERICAN: >60 ML/MIN/1.73
GLUCOSE BLD-MCNC: 268 MG/DL (ref 74–99)
METER GLUCOSE: 183 MG/DL (ref 74–99)
METER GLUCOSE: 199 MG/DL (ref 74–99)
METER GLUCOSE: 227 MG/DL (ref 74–99)
METER GLUCOSE: 252 MG/DL (ref 74–99)
POTASSIUM SERPL-SCNC: 3.8 MMOL/L (ref 3.5–5)
REASON FOR REJECTION: NORMAL
REJECTED TEST: NORMAL
SARS-COV-2: NOT DETECTED
SODIUM BLD-SCNC: 133 MMOL/L (ref 132–146)
SOURCE: NORMAL
TOTAL PROTEIN: 6.7 G/DL (ref 6.4–8.3)

## 2020-12-27 PROCEDURE — 2580000003 HC RX 258: Performed by: INTERNAL MEDICINE

## 2020-12-27 PROCEDURE — 82962 GLUCOSE BLOOD TEST: CPT

## 2020-12-27 PROCEDURE — 6360000002 HC RX W HCPCS: Performed by: INTERNAL MEDICINE

## 2020-12-27 PROCEDURE — 2700000000 HC OXYGEN THERAPY PER DAY

## 2020-12-27 PROCEDURE — 2060000000 HC ICU INTERMEDIATE R&B

## 2020-12-27 PROCEDURE — 6370000000 HC RX 637 (ALT 250 FOR IP): Performed by: INTERNAL MEDICINE

## 2020-12-27 PROCEDURE — 2500000003 HC RX 250 WO HCPCS: Performed by: INTERNAL MEDICINE

## 2020-12-27 PROCEDURE — 36415 COLL VENOUS BLD VENIPUNCTURE: CPT

## 2020-12-27 PROCEDURE — 80053 COMPREHEN METABOLIC PANEL: CPT

## 2020-12-27 RX ADMIN — REMDESIVIR 100 MG: 100 INJECTION, POWDER, LYOPHILIZED, FOR SOLUTION INTRAVENOUS at 18:12

## 2020-12-27 RX ADMIN — Medication 2000 UNITS: at 08:52

## 2020-12-27 RX ADMIN — Medication 3 MG: at 20:42

## 2020-12-27 RX ADMIN — Medication 10 ML: at 20:47

## 2020-12-27 RX ADMIN — ASPIRIN 81 MG: 81 TABLET, COATED ORAL at 08:52

## 2020-12-27 RX ADMIN — AMLODIPINE BESYLATE 10 MG: 10 TABLET ORAL at 08:52

## 2020-12-27 RX ADMIN — INSULIN LISPRO 3 UNITS: 100 INJECTION, SOLUTION INTRAVENOUS; SUBCUTANEOUS at 20:44

## 2020-12-27 RX ADMIN — Medication 1 TABLET: at 09:33

## 2020-12-27 RX ADMIN — CEFTRIAXONE SODIUM 1 G: 1 INJECTION, POWDER, FOR SOLUTION INTRAMUSCULAR; INTRAVENOUS at 23:10

## 2020-12-27 RX ADMIN — MEMANTINE HYDROCHLORIDE 5 MG: 5 TABLET, FILM COATED ORAL at 20:42

## 2020-12-27 RX ADMIN — ENOXAPARIN SODIUM 30 MG: 30 INJECTION SUBCUTANEOUS at 08:53

## 2020-12-27 RX ADMIN — Medication 10 ML: at 08:54

## 2020-12-27 RX ADMIN — INSULIN LISPRO 3 UNITS: 100 INJECTION, SOLUTION INTRAVENOUS; SUBCUTANEOUS at 06:01

## 2020-12-27 RX ADMIN — RISPERIDONE 5 MG: 2 TABLET, FILM COATED ORAL at 20:42

## 2020-12-27 RX ADMIN — VILAZODONE HYDROCHLORIDE 40 MG: 40 TABLET ORAL at 08:52

## 2020-12-27 RX ADMIN — MEMANTINE HYDROCHLORIDE 5 MG: 5 TABLET, FILM COATED ORAL at 08:52

## 2020-12-27 RX ADMIN — INSULIN LISPRO 3 UNITS: 100 INJECTION, SOLUTION INTRAVENOUS; SUBCUTANEOUS at 17:31

## 2020-12-27 RX ADMIN — DEXAMETHASONE 6 MG: 4 TABLET ORAL at 08:52

## 2020-12-27 RX ADMIN — LEVOTHYROXINE SODIUM 25 MCG: 0.03 TABLET ORAL at 05:14

## 2020-12-27 RX ADMIN — DOXYCYCLINE 100 MG: 100 INJECTION, POWDER, LYOPHILIZED, FOR SOLUTION INTRAVENOUS at 23:10

## 2020-12-27 RX ADMIN — INSULIN GLARGINE 10 UNITS: 100 INJECTION, SOLUTION SUBCUTANEOUS at 09:32

## 2020-12-27 RX ADMIN — INSULIN LISPRO 9 UNITS: 100 INJECTION, SOLUTION INTRAVENOUS; SUBCUTANEOUS at 12:06

## 2020-12-27 RX ADMIN — DOXYCYCLINE 100 MG: 100 INJECTION, POWDER, LYOPHILIZED, FOR SOLUTION INTRAVENOUS at 11:27

## 2020-12-27 RX ADMIN — CALCIUM POLYCARBOPHIL 625 MG: 625 TABLET, FILM COATED ORAL at 08:52

## 2020-12-27 RX ADMIN — ENOXAPARIN SODIUM 30 MG: 30 INJECTION SUBCUTANEOUS at 20:42

## 2020-12-27 ASSESSMENT — PAIN SCALES - GENERAL
PAINLEVEL_OUTOF10: 0

## 2020-12-27 NOTE — PROGRESS NOTES
Daily    polycarbophil  625 mg Oral Daily    risperiDONE  5 mg Oral Nightly    vilazodone HCl  40 mg Oral Daily    sodium chloride flush  10 mL Intravenous 2 times per day    enoxaparin  30 mg Subcutaneous BID    cefTRIAXone (ROCEPHIN) IV  1 g Intravenous Q24H    And    doxycycline (VIBRAMYCIN) IV  100 mg Intravenous Q12H    dexamethasone  6 mg Oral Daily    vitamin D  2,000 Units Oral Daily     PRN Meds: mineral oil-hydrophilic petrolatum, sodium chloride, sodium chloride flush, promethazine **OR** ondansetron, polyethylene glycol, acetaminophen **OR** acetaminophen, glucose, dextrose, glucagon (rDNA), dextrose    I/O  No intake or output data in the 24 hours ending 12/27/20 1122    Labs:   Recent Labs     12/24/20 2137 12/25/20  1145   WBC 11.3 8.5   HGB 11.8 12.9   HCT 34.3 38.0    298       Recent Labs     12/24/20 2137 12/25/20  1145    133   K 3.7 3.9    100   CO2 23 22   BUN 16 15   CREATININE 0.7 0.7   CALCIUM 10.4* 9.7       Recent Labs     12/24/20 2137 12/25/20  1145   PROT 6.5 6.9   ALKPHOS 56 58   ALT 21 24   AST 32* 34*   BILITOT 0.5 0.5       No results for input(s): INR in the last 72 hours. Recent Labs     12/24/20 2137   TROPONINI <0.01       Chronic labs:  Lab Results   Component Value Date    CHOL 160 08/03/2015    TRIG 190 (H) 08/03/2015    HDL 49 08/03/2015    LDLCALC 73 08/03/2015    TSH 2.500 12/24/2020    LABA1C 8.0 (H) 04/25/2020       Radiology:  Imaging studies reviewed today.     ASSESSMENT:  Acute hypoxic respiratory failure  Suspected COVID-19 pneumonia  Less likely bacterial multifocal pneumonia   DM2  Insomnia 2/2 steroids     PLAN:  Encourage po intake  Wean oxygen as tolerated  Glycemic control  Pharmacy consulted for remdesivir   Continue for empiric antibiotics   Appreciate ID input     Diet: DIET CARB CONTROL;  Code Status: Full Code  PT/OT Eval Status:   ordered  DVT Prophylaxis:   lovenox  Recommended disposition at discharge:  home w [facility] at

## 2020-12-27 NOTE — PROGRESS NOTES
[address] [place] [address] [facility]    Consultation Note     Admit Date: 12/24/2020  9:13 PM    Reason for Consult:    Possible COVID 23    Attending Physician:  Marshal Mohr MD     Chief Complaint:  She really had no complaint for me    HISTORY OF PRESENT ILLNESS:   The patient is a 80 y.o.  female known to the Infectious Diseases service. The patient has the below past med hx. She does not look her stated age and I spent time talking to her. ! Admitted with SOBI and CT lung suspicious for covid 19   She is on remdisivir and dexamethasone per dr. Jodie Encinas. CT scan showing multifocal airspace disease in the lungs bilaterally consisgtentj with multifocal pneumonia and suspicious for COVID 19 pneumona.        Past Medical History:        Diagnosis Date    Diabetes mellitus ([address]     Hyperlipidemia     Hypertension     Thyroid disease      Past Surgical History:        Procedure Laterality Date    DILATION AND CURETTAGE OF UTERUS      THYROIDECTOMY       Current Medications:   Scheduled Meds:   insulin lispro  0-18 Units Subcutaneous TID WC    insulin lispro  0-9 Units Subcutaneous Nightly    insulin glargine  10 Units Subcutaneous Daily    remdesivir IVPB  100 mg Intravenous Q24H    amLODIPine  10 mg Oral Daily    aspirin  81 mg Oral Daily    levothyroxine  25 mcg Oral Daily    linaclotide  145 mcg Oral QAM AC    melatonin  3 mg Oral Nightly    memantine  5 mg Oral BID    therapeutic multivitamin-minerals  1 tablet Oral Daily    polycarbophil  625 mg Oral Daily    risperiDONE  5 mg Oral Nightly    vilazodone HCl  40 mg Oral Daily    sodium chloride flush  10 mL Intravenous 2 times per day    enoxaparin  30 mg Subcutaneous BID    cefTRIAXone (ROCEPHIN) IV  1 g Intravenous Q24H    And    doxycycline (VIBRAMYCIN) IV  100 mg Intravenous Q12H    dexamethasone  6 mg Oral Daily    vitamin D  2,000 Units Oral Daily     Continuous Infusions:   dextrose       PRN Meds:mineral oil-hydrophilic petrolatum, sodium chloride, sodium chloride flush, promethazine **OR** ondansetron, polyethylene glycol, acetaminophen **OR** acetaminophen, glucose, dextrose, glucagon (rDNA), dextrose    Allergies:  Patient has no known allergies. Social History:   Social History     Socioeconomic History    Marital status:      Spouse name: Not on file    Number of children: Not on file    Years of education: Not on file    Highest education level: Not on file   Occupational History    Not on file   Social Needs    Financial resource strain: Not on file    Food insecurity     Worry: Not on file     Inability: Not on file    Transportation needs     Medical: Not on file     Non-medical: Not on file   Tobacco Use    Smoking status: Never Smoker    Smokeless tobacco: Never Used   Substance and Sexual Activity    Alcohol use: No    Drug use: No    Sexual activity: Not on file   Lifestyle    Physical activity     Days per week: Not on file     Minutes per session: Not on file    Stress: Not on file   Relationships    Social connections     Talks on phone: Not on file     Gets together: Not on file     Attends [employer] service: Not on file     Active member of club or organization: Not on file     Attends meetings of clubs or organizations: Not on file     Relationship status: Not on file    Intimate partner violence     Fear of current or ex partner: Not on file     Emotionally abused: Not on file     Physically abused: Not on file     Forced sexual activity: Not on file   Other Topics Concern    Not on file   Social History Narrative    Not on file     Tobacco: No  Alcohol: No  Pets: No  Travel: No    Family History:   No family history on file. . Otherwise non-pertinent to the chief complaint. REVIEW OF SYSTEMS:    CONSTITUTIONAL:  No chills, fevers or night sweats. No loss of weight. EYES:  No double vision or drainage from eyes, ears or throat. HEENT:  No neck stiffness.  No dysphagia. No drainage from eyes, ears or throat  RESPIRATORY:  No cough, productive sputum or hemoptysis. CARDIOVASCULAR:  No chest pain, palpitations, orthopnea or dyspnea on exertion. GASTROINTESTINAL:  No nausea, vomiting, diarrhea or constipation or hematochezia   GENITOURINARY:  No frequency burning dysuria or hematuria. INTEGUMENT/BREAST:  No rash or breast masses. HEMATOLOGIC/LYMPHATIC:  No lymphadenopathy or blood dyscrasics. ALLERGIC/IMMUNOLOGIC:  No anaphylaxis. ENDOCRINE:  No polyuria or polydipsia or temperature intolerance. MUSCULOSKELETAL:  No myalgia or arthralgia. Full ROM. NEUROLOGICAL:  No focal motor sensory deficit. BEHAVIOR/PSYCH:  No psychosis. PHYSICAL EXAM:    Vitals:    /63   Pulse 101   Temp 96.8 °F (36 °C) (Temporal)   Resp 18   Ht 5' 3\" (1.6 m)   Wt 120 lb 1 oz (54.5 kg)   LMP 08/01/2015   SpO2 99%   BMI 21.27 kg/m²   Constitutional: The patient is awake, alert, and oriented. Skin: Warm and dry. No rashes were noted. HEENT: Eyes show round, and reactive pupils. No jaundice. Moist mucous membranes, no ulcerations, no thrush. Neck: Supple to movements. No lymphadenopathy. Chest: No use of accessory muscles to breathe. Symmetrical expansion. Auscultation reveals no wheezing, crackles, or rhonchi. Cardiovascular: S1 and S2 are rhythmic and regular. No murmurs appreciated. Abdomen: Positive bowel sounds to auscultation. Benign to palpation. No masses felt. No hepatosplenomegaly. Extremities: No clubbing, no cyanosis, no edema.   Lines: peripheral      CBC+dif:  Recent Labs     12/24/20 2137 12/25/20  1145   WBC 11.3 8.5   HGB 11.8 12.9   HCT 34.3 38.0   MCV 85.3 86.6    298   NEUTROABS 8.99*  --      No results found for: CRP  No results found for: CRPHS  No results found for: SEDRATE  Lab Results   Component Value Date    ALT 24 12/25/2020    AST 34 (H) 12/25/2020    ALKPHOS 58 12/25/2020    BILITOT 0.5 12/25/2020     Lab Results Component Value Date     12/25/2020    K 3.9 12/25/2020     12/25/2020    CO2 22 12/25/2020    BUN 15 12/25/2020    CREATININE 0.7 12/25/2020    GFRAA >60 12/25/2020    LABGLOM >60 12/25/2020    GLUCOSE 386 12/25/2020    PROT 6.9 12/25/2020    LABALBU 3.2 12/25/2020    CALCIUM 9.7 12/25/2020    BILITOT 0.5 12/25/2020    ALKPHOS 58 12/25/2020    AST 34 12/25/2020    ALT 24 12/25/2020       No results found for: PROTIME, INR    Lab Results   Component Value Date    TSH 2.500 12/24/2020       Lab Results   Component Value Date    COLORU Yellow 12/24/2020    PHUR 6.0 12/24/2020    WBCUA 0-1 12/24/2020    RBCUA 0-1 12/24/2020    BACTERIA RARE 12/24/2020    CLARITYU Clear 12/24/2020    SPECGRAV >=1.030 12/24/2020    LEUKOCYTESUR Negative 12/24/2020    UROBILINOGEN 0.2 12/24/2020    BILIRUBINUR Negative 12/24/2020    BLOODU Negative 12/24/2020    GLUCOSEU 250 12/24/2020       No results found for: [address], BEART, [id number], PHART, THGBART, [id number], PO2ART, [place]  Radiology:  CTA CHEST W CONTRAST   Final Result   No evidence of pulmonary emboli. Multifocal airspace disease in the lungs bilaterally, consistent with   multifocal pneumonia and suspicious for COVID-19 pneumonia. Nonspecific small hilar and mediastinal nodes, which may be reactive. Mild cardiomegaly with coronary atherosclerotic calcifications. XR CHEST PORTABLE   Final Result   Patchy multifocal infiltrates concerning for pneumonia. Microbiology:  Pending  Recent Labs     12/24/20  2252   BC 24 Hours no growth     No results for input(s): ORG in the last 72 hours. Recent Labs     12/24/20  2252   BLOODCULT2 24 Hours no growth     No results for input(s): STREPNEUMAGU in the last 72 hours. No results for input(s): LP1UAG in the last 72 hours. No results for input(s): ASO in the last 72 hours. No results for input(s): CULTRESP in the last 72 hours.     Assessment:  · COVID 19 suspicion  · Agree with remdisivir and dexamethasone    · She was delightful !!!  · Pulse ox 94 on 3 liters  · Afebrile   · She states she did not sleep well but it was not due to sob    Plan:    · Cont rem and dexamethasone  · Agree with ceftriaxone as you are doing   · Check cultures  · Baseline ESR, CRP  · Monitor labs  · Will follow with you  · Urine antigens are pending   · Stay the course for now  · Dr Jada Branch' noted    Thank you for having us see this patient in consultation. I will be discussing this case with the treating physicians.       Electronically signed by Davon Garcia MD on 12/27/2020 at 12:26 PM

## 2020-12-28 LAB
METER GLUCOSE: 162 MG/DL (ref 74–99)
METER GLUCOSE: 176 MG/DL (ref 74–99)
METER GLUCOSE: 236 MG/DL (ref 74–99)
METER GLUCOSE: 275 MG/DL (ref 74–99)
URINE CULTURE, ROUTINE: NORMAL

## 2020-12-28 PROCEDURE — 97535 SELF CARE MNGMENT TRAINING: CPT

## 2020-12-28 PROCEDURE — 97530 THERAPEUTIC ACTIVITIES: CPT

## 2020-12-28 PROCEDURE — 97162 PT EVAL MOD COMPLEX 30 MIN: CPT

## 2020-12-28 PROCEDURE — 6370000000 HC RX 637 (ALT 250 FOR IP): Performed by: INTERNAL MEDICINE

## 2020-12-28 PROCEDURE — 82962 GLUCOSE BLOOD TEST: CPT

## 2020-12-28 PROCEDURE — 97166 OT EVAL MOD COMPLEX 45 MIN: CPT

## 2020-12-28 PROCEDURE — XW033E5 INTRODUCTION OF REMDESIVIR ANTI-INFECTIVE INTO PERIPHERAL VEIN, PERCUTANEOUS APPROACH, NEW TECHNOLOGY GROUP 5: ICD-10-PCS | Performed by: INTERNAL MEDICINE

## 2020-12-28 PROCEDURE — 2580000003 HC RX 258: Performed by: INTERNAL MEDICINE

## 2020-12-28 PROCEDURE — 2700000000 HC OXYGEN THERAPY PER DAY

## 2020-12-28 PROCEDURE — 2060000000 HC ICU INTERMEDIATE R&B

## 2020-12-28 PROCEDURE — 2500000003 HC RX 250 WO HCPCS: Performed by: INTERNAL MEDICINE

## 2020-12-28 PROCEDURE — 6360000002 HC RX W HCPCS: Performed by: INTERNAL MEDICINE

## 2020-12-28 RX ORDER — RISPERIDONE 0.5 MG/1
0.5 TABLET, FILM COATED ORAL NIGHTLY
COMMUNITY

## 2020-12-28 RX ORDER — ASCORBIC ACID 125 MG
5000 TABLET,CHEWABLE ORAL DAILY
COMMUNITY

## 2020-12-28 RX ORDER — MULTIVIT-MIN/FA/LYCOPEN/LUTEIN .4-300-25
1 TABLET ORAL DAILY
COMMUNITY

## 2020-12-28 RX ORDER — LANOLIN ALCOHOL/MO/W.PET/CERES
6 CREAM (GRAM) TOPICAL NIGHTLY
COMMUNITY

## 2020-12-28 RX ORDER — AMOXICILLIN 500 MG
1200 CAPSULE ORAL DAILY
COMMUNITY

## 2020-12-28 RX ORDER — ATORVASTATIN CALCIUM 20 MG/1
20 TABLET, FILM COATED ORAL DAILY
COMMUNITY
End: 2021-09-27 | Stop reason: SDUPTHER

## 2020-12-28 RX ORDER — VILAZODONE HYDROCHLORIDE 40 MG/1
40 TABLET ORAL
COMMUNITY

## 2020-12-28 RX ORDER — ACETAMINOPHEN/DIPHENHYDRAMINE 500MG-25MG
1 TABLET ORAL NIGHTLY PRN
COMMUNITY

## 2020-12-28 RX ORDER — ACETAMINOPHEN 160 MG
2000 TABLET,DISINTEGRATING ORAL DAILY
COMMUNITY

## 2020-12-28 RX ORDER — PLECANATIDE 3 MG/1
3 TABLET ORAL DAILY PRN
COMMUNITY

## 2020-12-28 RX ORDER — VIT A/VIT C/VIT E/ZINC/COPPER 2148-113
1 TABLET ORAL DAILY
COMMUNITY

## 2020-12-28 RX ADMIN — ASPIRIN 81 MG: 81 TABLET, COATED ORAL at 08:02

## 2020-12-28 RX ADMIN — CEFTRIAXONE SODIUM 1 G: 1 INJECTION, POWDER, FOR SOLUTION INTRAMUSCULAR; INTRAVENOUS at 21:57

## 2020-12-28 RX ADMIN — INSULIN LISPRO 6 UNITS: 100 INJECTION, SOLUTION INTRAVENOUS; SUBCUTANEOUS at 11:07

## 2020-12-28 RX ADMIN — DOXYCYCLINE 100 MG: 100 INJECTION, POWDER, LYOPHILIZED, FOR SOLUTION INTRAVENOUS at 10:49

## 2020-12-28 RX ADMIN — INSULIN GLARGINE 10 UNITS: 100 INJECTION, SOLUTION SUBCUTANEOUS at 11:05

## 2020-12-28 RX ADMIN — ENOXAPARIN SODIUM 30 MG: 30 INJECTION SUBCUTANEOUS at 20:09

## 2020-12-28 RX ADMIN — MEMANTINE HYDROCHLORIDE 5 MG: 5 TABLET, FILM COATED ORAL at 20:09

## 2020-12-28 RX ADMIN — Medication 10 ML: at 20:05

## 2020-12-28 RX ADMIN — Medication 3 MG: at 20:10

## 2020-12-28 RX ADMIN — RISPERIDONE 5 MG: 2 TABLET, FILM COATED ORAL at 20:09

## 2020-12-28 RX ADMIN — REMDESIVIR 100 MG: 100 INJECTION, POWDER, LYOPHILIZED, FOR SOLUTION INTRAVENOUS at 17:30

## 2020-12-28 RX ADMIN — INSULIN LISPRO 9 UNITS: 100 INJECTION, SOLUTION INTRAVENOUS; SUBCUTANEOUS at 16:52

## 2020-12-28 RX ADMIN — Medication 2000 UNITS: at 08:01

## 2020-12-28 RX ADMIN — DOXYCYCLINE 100 MG: 100 INJECTION, POWDER, LYOPHILIZED, FOR SOLUTION INTRAVENOUS at 21:57

## 2020-12-28 RX ADMIN — VILAZODONE HYDROCHLORIDE 40 MG: 40 TABLET ORAL at 08:01

## 2020-12-28 RX ADMIN — ENOXAPARIN SODIUM 30 MG: 30 INJECTION SUBCUTANEOUS at 08:01

## 2020-12-28 RX ADMIN — DEXAMETHASONE 6 MG: 4 TABLET ORAL at 08:01

## 2020-12-28 RX ADMIN — INSULIN LISPRO 2 UNITS: 100 INJECTION, SOLUTION INTRAVENOUS; SUBCUTANEOUS at 21:45

## 2020-12-28 RX ADMIN — Medication 1 TABLET: at 08:02

## 2020-12-28 RX ADMIN — Medication 10 ML: at 10:40

## 2020-12-28 RX ADMIN — MEMANTINE HYDROCHLORIDE 5 MG: 5 TABLET, FILM COATED ORAL at 08:02

## 2020-12-28 RX ADMIN — LEVOTHYROXINE SODIUM 25 MCG: 0.03 TABLET ORAL at 05:43

## 2020-12-28 RX ADMIN — CALCIUM POLYCARBOPHIL 625 MG: 625 TABLET, FILM COATED ORAL at 08:01

## 2020-12-28 RX ADMIN — AMLODIPINE BESYLATE 10 MG: 10 TABLET ORAL at 08:02

## 2020-12-28 RX ADMIN — INSULIN LISPRO 1 UNITS: 100 INJECTION, SOLUTION INTRAVENOUS; SUBCUTANEOUS at 06:09

## 2020-12-28 ASSESSMENT — PAIN SCALES - GENERAL
PAINLEVEL_OUTOF10: 0

## 2020-12-28 NOTE — PROGRESS NOTES
Oral Nightly    vilazodone HCl  40 mg Oral Daily    sodium chloride flush  10 mL Intravenous 2 times per day    enoxaparin  30 mg Subcutaneous BID    cefTRIAXone (ROCEPHIN) IV  1 g Intravenous Q24H    And    doxycycline (VIBRAMYCIN) IV  100 mg Intravenous Q12H    dexamethasone  6 mg Oral Daily    vitamin D  2,000 Units Oral Daily     PRN Meds: mineral oil-hydrophilic petrolatum, sodium chloride, sodium chloride flush, promethazine **OR** ondansetron, polyethylene glycol, acetaminophen **OR** acetaminophen, glucose, dextrose, glucagon (rDNA), dextrose    I/O    Intake/Output Summary (Last 24 hours) at 12/28/2020 1234  Last data filed at 12/28/2020 0547  Gross per 24 hour   Intake 240 ml   Output 350 ml   Net -110 ml       Labs:   No results for input(s): WBC, HGB, HCT, PLT in the last 72 hours. Recent Labs     12/27/20  1227      K 3.8   CL 97*   CO2 22   BUN 14   CREATININE 0.5   CALCIUM 9.6       Recent Labs     12/27/20  1227   PROT 6.7   ALKPHOS 59   ALT 36*   AST 51*   BILITOT 0.4       No results for input(s): INR in the last 72 hours. No results for input(s): Gemma Journey in the last 72 hours. Chronic labs:  Lab Results   Component Value Date    CHOL 160 08/03/2015    TRIG 190 (H) 08/03/2015    HDL 49 08/03/2015    LDLCALC 73 08/03/2015    TSH 2.500 12/24/2020    LABA1C 8.0 (H) 04/25/2020       Radiology:  Imaging studies reviewed today.     ASSESSMENT:  Acute hypoxic respiratory failure  Suspected COVID-19 pneumonia  Less likely bacterial multifocal pneumonia   DM2  Insomnia 2/2 steroids     PLAN:  Encourage po intake  Wean oxygen as tolerated  Glycemic control  Pharmacy consulted for remdesivir   Continue for empiric antibiotics   Appreciate ID input   PT/OT    Diet: DIET CARB CONTROL;  Code Status: Full Code  PT/OT Eval Status:   ordered  DVT Prophylaxis:   lovenox  Recommended disposition at discharge:  home w [facility] at dc     +++++++++++++++++++++++++++++++++++++++++++++++++  Rachel

## 2020-12-28 NOTE — CARE COORDINATION
I spoke with pt's sister Cm Soriano whom verified pt report. She will continue to keep pt's private pay care givers, and provide transportation. Cm Soriano requesting [facility], no preference, referral made to [facility], awaiting acceptance. The Plan for Transition of Care is related to the following treatment goals: medical stability    The Patient and / or Cm Soriano (sister)  was provided with a choice of provider and agrees   with the discharge plan. [x] Yes [] No    Freedom of choice list was provided with basic dialogue that supports the patient's individualized plan of care/goals, treatment preferences and shares the quality data associated with the providers.  [x] Yes [] No Problem: CARDIOVASCULAR - ADULT  Goal: Maintains optimal cardiac output and hemodynamic stability  INTERVENTIONS:  - Monitor vital signs, rhythm, and trends  - Monitor for bleeding, hypotension and signs of decreased cardiac output  - Evaluate effectivenes remains intact  INTERVENTIONS  - Assess and document risk factors for pressure ulcer development  - Assess and document skin integrity  - Monitor for areas of redness and/or skin breakdown  - Initiate interventions, skin care algorithm/standards of care as changes in neurological status  - Initiate measures to prevent increased intracranial pressure  - Maintain blood pressure and fluid volume within ordered parameters to optimize cerebral perfusion and minimize risk of hemorrhage  - Monitor temperature, gluc

## 2020-12-28 NOTE — CARE COORDINATION
[facility] too full to accept. Referral made to [facility] via [facility], anticipate dishcarge within the next 48-72 hours. Discharge plan is home with [facility], and private pay personal care.

## 2020-12-28 NOTE — CARE COORDINATION
After multiple attempts to call pt (d/t covid +), met with pt at bedside to discuss discharge / transition of care plan. Pt reports from home, owns cane, if home oxygen is required no preference concerning DME company; referral made to Ronak Espinoza with [facility]; has 24 hour care giver 7 days a week; sister Hilda Kaur schedules (attempted to call Hilda Kaur however, no answer); Hilda Kaur or her sons to provide transportation home when pt is medically stable; PCP is Mayte Caruso; pharmacy is [facility]. The Plan for Transition of Care is related to the following treatment goals: medical stability    The Patient was provided with a choice of provider and agrees   with the discharge plan. [x] Yes [] No    Freedom of choice list was provided with basic dialogue that supports the patient's individualized plan of care/goals, treatment preferences and shares the quality data associated with the providers.  [x] Yes [] No

## 2020-12-28 NOTE — PROGRESS NOTES
[address] [place] [address] [facility]    Consultation Note     Admit Date: 12/24/2020  9:13 PM    Reason for Consult:    Possible COVID 23    Attending Physician:  Adan Trejo MD     Chief Complaint:  She really had no complaint for me    HISTORY OF PRESENT ILLNESS:   The patient is a 80 y.o.  female known to the Infectious Diseases service. The patient has the below past med hx. She does not look her stated age and I spent time talking to her. ! Admitted with SOBI and CT lung suspicious for covid 19   She is on remdisivir and dexamethasone per dr. Lexie Hayes. CT scan showing multifocal airspace disease in the lungs bilaterally consisgtentj with multifocal pneumonia and suspicious for COVID 19 pneumona.        Past Medical History:        Diagnosis Date    Diabetes mellitus ([address]     Hyperlipidemia     Hypertension     Thyroid disease      Past Surgical History:        Procedure Laterality Date    DILATION AND CURETTAGE OF UTERUS      THYROIDECTOMY       Current Medications:   Scheduled Meds:   insulin lispro  0-18 Units Subcutaneous TID WC    insulin lispro  0-9 Units Subcutaneous Nightly    insulin glargine  10 Units Subcutaneous Daily    remdesivir IVPB  100 mg Intravenous Q24H    amLODIPine  10 mg Oral Daily    aspirin  81 mg Oral Daily    levothyroxine  25 mcg Oral Daily    linaclotide  145 mcg Oral QAM AC    melatonin  3 mg Oral Nightly    memantine  5 mg Oral BID    therapeutic multivitamin-minerals  1 tablet Oral Daily    polycarbophil  625 mg Oral Daily    risperiDONE  5 mg Oral Nightly    vilazodone HCl  40 mg Oral Daily    sodium chloride flush  10 mL Intravenous 2 times per day    enoxaparin  30 mg Subcutaneous BID    cefTRIAXone (ROCEPHIN) IV  1 g Intravenous Q24H    And    doxycycline (VIBRAMYCIN) IV  100 mg Intravenous Q12H    dexamethasone  6 mg Oral Daily    vitamin D  2,000 Units Oral Daily     Continuous Infusions:   dextrose       PRN Meds:mineral oil-hydrophilic petrolatum, sodium chloride, sodium chloride flush, promethazine **OR** ondansetron, polyethylene glycol, acetaminophen **OR** acetaminophen, glucose, dextrose, glucagon (rDNA), dextrose    Allergies:  Patient has no known allergies. Social History:   Social History     Socioeconomic History    Marital status:      Spouse name: Not on file    Number of children: Not on file    Years of education: Not on file    Highest education level: Not on file   Occupational History    Not on file   Social Needs    Financial resource strain: Not on file    Food insecurity     Worry: Not on file     Inability: Not on file    Transportation needs     Medical: Not on file     Non-medical: Not on file   Tobacco Use    Smoking status: Never Smoker    Smokeless tobacco: Never Used   Substance and Sexual Activity    Alcohol use: No    Drug use: No    Sexual activity: Not on file   Lifestyle    Physical activity     Days per week: Not on file     Minutes per session: Not on file    Stress: Not on file   Relationships    Social connections     Talks on phone: Not on file     Gets together: Not on file     Attends Druze service: Not on file     Active member of club or organization: Not on file     Attends meetings of clubs or organizations: Not on file     Relationship status: Not on file    Intimate partner violence     Fear of current or ex partner: Not on file     Emotionally abused: Not on file     Physically abused: Not on file     Forced sexual activity: Not on file   Other Topics Concern    Not on file   Social History Narrative    Not on file     Tobacco: No  Alcohol: No  Pets: No  Travel: No    Family History:   No family history on file. . Otherwise non-pertinent to the chief complaint. REVIEW OF SYSTEMS:    CONSTITUTIONAL:  No chills, fevers or night sweats. No loss of weight. EYES:  No double vision or drainage from eyes, ears or throat. HEENT:  No neck stiffness.  No dysphagia. No drainage from eyes, ears or throat  RESPIRATORY:  No cough, productive sputum or hemoptysis. CARDIOVASCULAR:  No chest pain, palpitations, orthopnea or dyspnea on exertion. GASTROINTESTINAL:  No nausea, vomiting, diarrhea or constipation or hematochezia   GENITOURINARY:  No frequency burning dysuria or hematuria. INTEGUMENT/BREAST:  No rash or breast masses. HEMATOLOGIC/LYMPHATIC:  No lymphadenopathy or blood dyscrasics. ALLERGIC/IMMUNOLOGIC:  No anaphylaxis. ENDOCRINE:  No polyuria or polydipsia or temperature intolerance. MUSCULOSKELETAL:  No myalgia or arthralgia. Full ROM. NEUROLOGICAL:  No focal motor sensory deficit. BEHAVIOR/PSYCH:  No psychosis. PHYSICAL EXAM:    Vitals:    BP (!) 140/60   Pulse 95   Temp 98.5 °F (36.9 °C) (Temporal)   Resp 20   Ht 5' 3\" (1.6 m)   Wt 120 lb 1 oz (54.5 kg)   LMP 08/01/2015   SpO2 93%   BMI 21.27 kg/m²   Constitutional: The patient is awake, alert, and oriented. Skin: Warm and dry. No rashes were noted. HEENT: Eyes show round, and reactive pupils. No jaundice. Moist mucous membranes, no ulcerations, no thrush. Neck: Supple to movements. No lymphadenopathy. Chest: No use of accessory muscles to breathe. Symmetrical expansion. Auscultation reveals no wheezing, crackles, or rhonchi. Cardiovascular: S1 and S2 are rhythmic and regular. No murmurs appreciated. Abdomen: Positive bowel sounds to auscultation. Benign to palpation. No masses felt. No hepatosplenomegaly. Extremities: No clubbing, no cyanosis, no edema. Lines: peripheral      CBC+dif:  No results for input(s): WBC, HGB, HCT, MCV, PLT, NEUTROABS in the last 72 hours.   No results found for: CRP  No results found for: CRPHS  No results found for: SEDRATE  Lab Results   Component Value Date    ALT 36 (H) 12/27/2020    AST 51 (H) 12/27/2020    ALKPHOS 59 12/27/2020    BILITOT 0.4 12/27/2020     Lab Results   Component Value Date     12/27/2020    K 3.8 12/27/2020 K 3.9 12/25/2020    CL 97 12/27/2020    CO2 22 12/27/2020    BUN 14 12/27/2020    CREATININE 0.5 12/27/2020    GFRAA >60 12/27/2020    LABGLOM >60 12/27/2020    GLUCOSE 268 12/27/2020    PROT 6.7 12/27/2020    LABALBU 3.2 12/27/2020    CALCIUM 9.6 12/27/2020    BILITOT 0.4 12/27/2020    ALKPHOS 59 12/27/2020    AST 51 12/27/2020    ALT 36 12/27/2020       No results found for: PROTIME, INR    Lab Results   Component Value Date    TSH 2.500 12/24/2020       Lab Results   Component Value Date    COLORU Yellow 12/24/2020    PHUR 6.0 12/24/2020    WBCUA 0-1 12/24/2020    RBCUA 0-1 12/24/2020    BACTERIA RARE 12/24/2020    CLARITYU Clear 12/24/2020    SPECGRAV >=1.030 12/24/2020    LEUKOCYTESUR Negative 12/24/2020    UROBILINOGEN 0.2 12/24/2020    BILIRUBINUR Negative 12/24/2020    BLOODU Negative 12/24/2020    GLUCOSEU 250 12/24/2020       No results found for: [place], BEART, [id number], PHART, THGBART, [id number], PO2ART, [place]  Radiology:  CTA CHEST W CONTRAST   Final Result   No evidence of pulmonary emboli. Multifocal airspace disease in the lungs bilaterally, consistent with   multifocal pneumonia and suspicious for COVID-19 pneumonia. Nonspecific small hilar and mediastinal nodes, which may be reactive. Mild cardiomegaly with coronary atherosclerotic calcifications. XR CHEST PORTABLE   Final Result   Patchy multifocal infiltrates concerning for pneumonia. Microbiology:  Pending  No results for input(s): BC in the last 72 hours. No results for input(s): ORG in the last 72 hours. No results for input(s): Holli Armando in the last 72 hours. No results for input(s): STREPNEUMAGU in the last 72 hours. No results for input(s): LP1UAG in the last 72 hours. No results for input(s): ASO in the last 72 hours. No results for input(s): CULTRESP in the last 72 hours.     Assessment:  · COVID 19 suspicion  · Agree with remdisivir and dexamethasone    · She was delightful !!!      Plan:    · Cont rem and

## 2020-12-28 NOTE — PROGRESS NOTES
Physical Therapy  Physical Therapy Initial Assessment   Name: Lalit Serrano  : 1926  MRN: [id number]    Referring Provider:  Orlando Lawson MD    Date of Service: 2020    Evaluating PT:  Timothy Ott, PT, DPT [id number]    Room #:  9235/4707-C  Diagnosis:  Acute Respiratory Failure with Hypoxia  PMHx/PSHx:  HLD, DM, HTN, thyroid disease  Precautions:  Falls, fatigue, DROPLET PLUS ISOLATION, COVID +  Equipment Needs:  [address]    SUBJECTIVE:    Pt lives alone in a 1 story home with 3 stairs to enter and 1 rail. Bed is on first floor and bath is on first floor. Pt ambulated with no AD independently PTA. Pt reports she has 24/7 caregivers that assist with ADLs, mobility, and transportation. OBJECTIVE:   Initial Evaluation  Date: 2020 Treatment Short Term/ Long Term   Goals   AM-PAC 6 Clicks 56/40     Was pt agreeable to Eval/treatment? yes     Does pt have pain? No c/o pain. Bed Mobility  Rolling: Dilip  Supine to sit: Dilip  Sit to supine: Dilip  Scooting: Dilip  Rolling: Independent  Supine to sit: Independent  Sit to supine: Independent  Scooting: Independent   Transfers Sit to stand: Dilip  Stand to sit: Dilip  Stand pivot: Dilip [address]  Sit to stand: Independent  Stand to sit: Independent  Stand pivot: [place] [address]   Ambulation    15 feet with front [address] Dilip  50 feet with front [address] [place]   Stair negotiation: ascended and descended  NT  NA   ROM BUE:  Per OT note  BLE:  WNL     Strength BUE:  Per OT note  BLE:  WNL     Balance Sitting EOB:  SBA  Dynamic Standing:  Dilip [address]  Sitting EOB:  Independent  Dynamic Standing:  [place] [address]     Pt is A & O x 4  Sensation:  Pt denies numbness and tingling to extremities  Edema:  unremarkable    Vitals:  SPo2 monitored throughout session on 3L NC and maintained >92% throughout. Patient education  Pt educated on role of PT intervention. Pt educated on safety in room with utilization of call light for assistance with mobility.   Pt educated on importance of maximizing OOB time by transferring to bedside chair for meals and ambulating to bathroom/transferring to bedside commode with assistance from nursing and therapy staff to increase functional activity tolerance and overall functional independence. Patient response to education:   Pt verbalized understanding Pt demonstrated skill Pt requires further education in this area   yes yes yes     ASSESSMENT:    Comments:  Additional time was required for donning/doffing of all necessary PPE for droplet plus isolation for COVID-19 as well as proper sanitization of all equipment utilized during session. RN cleared pt for activity prior to session. Pt received supine in bed and agreeable to PT intervention at this time. Pt performed all functional mobility as noted above. Pt presents with fatigue and impaired activity tolerance. Her participation was limited by fatigue. Pt transferred to bedside chair and left with all needs met and call light in reach. Later returned to transfer pt back to bed after she sat up for only 30 minutes. Pt left in supine with all needs met and call light in reach. Pt requires continued skilled PT intervention for the purposes of maximizing functional mobility and independence. Treatment:  Patient practiced and was instructed in the following treatment:     Therapeutic Activities Completed:  o Functional mobility as noted above:   - Bed mobility: Dilip all aspects. Pt reliant on bed rail at this time. Limited yb chronic shoulder pain/limited ROM. Cues and hand over hand guidance to facilitate efficient use of BUE on bed rail for more independent completion of task. - Transfer training: sit<>stand from [address] Sit<>stand from chair modA. Stand pivot with front [employer] Dilip x 2 reps bed<>chair. Cues throughout for proper hand placement and sequencing.  - Ambulation: 15 feet bed>chair with front [employer] Dilip.   Pt retropulsive requiring cues and assistance to correct.  o Skilled repositioning in supine with HOB elevated for comfort and good posture to promote improved cardiorespiratory function. o Skilled vitals monitoring as noted above.  o Pt education as noted above. Pt's/ family goals   1. Return home. Patient and or family understand(s) diagnosis, prognosis, and plan of care. yes    PLAN OF CARE:    Current Treatment Recommendations     [x] Strengthening     [x] ROM   [x] Balance Training   [x] Endurance Training   [x] Transfer Training   [x] Gait Training   [x] Stair Training   [x] Positioning   [x] Safety and Education Training   [x] Patient/Caregiver Education   [] HEP  [] Other     PT care will be provided in accordance with the objectives noted above. The above treatment recommendations will be utilized to address deficits described above in order to restore pt's prior level of function and/or achieve modified functional independence with adaptive strategies. Frequency of treatments: 2-5x/week x 1-2 weeks. Time in  1407  Time out  1430     Total Treatment Time  15 minutes     Evaluation Time includes thorough review of current medical information, gathering information on past medical history/social history and prior level of function, completion of standardized testing/informal observation of tasks, assessment of data and education on plan of care and goals.     CPT codes:  [] Low Complexity PT evaluation 88655  [x] Moderate Complexity PT evaluation 06806  [] High Complexity PT evaluation 71151  [] PT Re-evaluation 58659  [] Gait training 55815 0 minutes  [] Manual therapy 70154 0 minutes  [x] Therapeutic activities 97139 15 minutes  [] Therapeutic exercises 19560 0 minutes  [] Neuromuscular reeducation 22575 0 minutes     Oswald Dawson, PT, DPT  [id number]

## 2020-12-28 NOTE — PROGRESS NOTES
Occupational Therapy  OCCUPATIONAL THERAPY INITIAL EVALUATION      Date:2020  Patient Name: Annabella Garcia  MRN: [id number]  : 1926  Room: -A    Evaluating OT: Richard Damon OTR/L #[id number]    Referring physician: Michael Jackson MD    AM-PAC Daily Activity Raw Score:   Recommended Adaptive Equipment: TBD     Reason for Admission/Diagnosis: Acute respiratory failure with hypoxia    Pertinent Medical History/Surgery: DM, HLD, HTN, thyroid disease s/p thyroidectomy, meningioma, anxiety, complicated UTI, dizziness of unknown cause      Precautions: Droplet plus (COVID-19), Falls, [place], alarm, tele, supplemental O2     Home Living: Pt lives alone but with  caregiver assistance in a one story house with 3 steps to enter with railing. Bathroom setup: standard commode, no modifications  Equipment owned: cane  Prior Level of Function:   Assist with ADLs ,  assist with IADLs. Patient was using no device for functional mobility but required assistance from caregiver. Driving: no                             Occupation: Retired seamstress  Leisure:Sewing, reading    Pain Level: No pain reported  Cognition: A&O: self:yes, place:yes, time: yes, situation:no  Follows 1-2 step directions with repetition due to [facility]   Memory:  fair    Sequencing:  fair    Problem solving:  poor   Judgement/safety:  poor     Functional Assessment:   Initial Eval Status  Date: 20 Treatment Status  Date: Short Term Goals=LTG  Treatment frequency: PRN 1-3 x/week   Feeding Minimal Assist   Stand by Assist    Grooming Moderate Assist   Minimal Assist     UB Dressing Maximal Assist   Doffed/ donned hospital gown  Moderate Assist    LB Dressing Dependent   Maximal Assist    Bathing Dependent   Maximal Assist    Toileting Maximal Assist   Completed toileting using bsc with max A for ines-hygiene  Minimal Assist    Bed Mobility  Supine to sit: Mod A   Sit to supine:  Mod A  Supine to sit: SBA   Sit to supine: SBA Functional Transfers Sit to stand: Mod A  Stand to sit: Mod A  Stand pivot: Mod A   Bed<>bsc  SBA using LRD   Functional Mobility NT  Patient unable to safely complete  Min A using LRD  Bed<>bathroom   Balance Sitting:     Static:CGA     Standing: Mod A     Activity Tolerance Fair-  Fair+   Visual/  Perceptual Glasses: no         Safety       Fair-                  Fair+     Upper Extremities:    Treatment Status  Date: Short Term Goals=LTG   B UE UE ROM:   R shoulder active ROM: ~0-30 degrees, compensatory techniques observed  L shoulder active ROM: ~0-10 degrees  B shoulder passive ROM: ~0-75 degrees  Patient reports limited shoulder ROM at baseline  B elbow-hand active ROM: WFL           Strength: B:3+/5           Coordination Fine/gross Motor Coordination: Impaired                        Hand dominance: R handed    Hearing: Patient [place]  Sensation:  No acute c/o numbness or tingling  Tone:  WFL  Edema: none observed B UE                            Comments: Nursing approved OT session. Upon arrival, patient semi-supine in bed and agreeable to session. OT evaluation performed with education provided regarding the purpose and benefits of OT session, along with mobility and I/ADL completion. Patient demonstrates impaired performance with ADLs/ functional mobility due to weakness and impaired activity tolerance. At end of session, patient semi-supine in bed with call light and phone within reach, along with all lines and tubes intact. Nursing notified. Treatment: OT treatment provided this date includes:    ADL training-  Instruction/training on safety and adapted techniques for completion of ADLs: Facilitated toileting using bsc for safety. Max A for ines-hygiene with patient able to assist with front ines-hygiene while seated with additional assistance for thoroughness and safety. Min A to complete hand hygiene using wipe while seated.  Mod A to wash face while semi-supine in bed with hand over hand assistance and verbal cues for technique. Max A to [facility] gown with verbal cues for positioning and technique.   Mobility training-  Instruction/training on safety and improved independence with bed mobility with mod A supine<>sit and verbal/tactile cues for technique. Mod A sit<>stand and to complete stand pivot transfer bed<>bsc with verbal/tactile cues for hand placement and technique    Skilled monitoring of vitals-  Skilled monitoring of the patient's response throughout treatment. Vitals: On 2 L O2  Rest: O2 sat 93%, HR 99 bpm  Upon completion of bed>bsc transfer: O2 sat 84 increasing to 90% after ~ 3 minutes with cues for pursed lip breathing,  bpm  Upon completion of bsc>bed transfer: O2 sat 96%,  bpm  End of session semi-supine in bed: O2 sat 93%, HR 88 bpm    Patient would  benefit from continued skilled OT  to maximize functional outcomes as they relate to I/ADLs and functional mobility. Eval Complexity: Mod  · Evaluation Complexity: Mod Complexity  ? History: Expanded review of medical records and additional review of physical, cognitive, or psychosocial history related to current functional performance  ? Exam: 5+ performance deficits  ? Assistance/Modification: mod/max assistance or modifications required to perform tasks. May have comorbidities that affect occupational performance.       Assessment of current deficits   Functional mobility [x]  ADLs [x] Strength [x]  Cognition []  Functional transfers  [x] IADLs [x] Safety Awareness [x]  Endurance/Activity tolerance [x]  Fine Motor Coordination [] Balance [x] Vision/perception [] Sensation []   Gross Motor Coordination [x] ROM [x] Delirium []                  Motor Control []    Plan of Care:  OT 1-3x/week for 1-2 weeks PRN   [x] ADL retraining/AE recommendations to maximize patient safety and performance with self-care   [x] Energy Conservation Techniques/Strategies      [] Neuromuscular Re-Education     [x] Functional Transfer Training to maximize safety utilizing LRD          [x] Functional Mobility Training to maximize safety utilizing LRD       [] Cognitive Re-Training         [] Splinting/Positioning Needs           [x] Therapeutic Activity to improve activity tolerance for functional activities and ADLs    [x]Therapeutic Exercise to improve UE strength for functional transfers and ADLs   [] Visual/Perceptual   [x] Delirium Prevention/Treatment to maximize functional outcomes  [x] Positioning to Improve Functional [place], Safety, and Skin Integrity   [x] Patient and/or Family Education to Increase Safety and Functional [place]   [] Other:     Rehab Potential: Good for established goals    Patient / Family Goal: Patient did not verbalize any goals     Evaluation time includes thorough review of current medical information, gathering information on past medical & social history & PLOF, completion of standardized testing, informal observation of tasks, consultation with other medical professions/disciplines, assessment of data & development of POC/goals. Patient and/or family were instructed diagnosis, prognosis/goals and plan of care. yes Demonstrated fair understanding. Time In: 09:02  Time Out: 09:33  Total Treatment Time: 31 minutes   Min Units   OT Eval Low 11729     OT Eval Medium 97166 X 1   OT Eval High 95765     OT Re-Eval [id number]     Therapeutic Ex 94667     Therapeutic Activities 26639 6 0   ADL/Self Care 76738 10 1   Orthotic Management 31184     Neuro Re-Ed 63249     Non-Billable Time     TOTAL TIMED TREATMENT 16      [] Malnutrition indicators have been identified and nursing has been notified to ensure a dietitian consult is ordered.       Mod OT Evaluation + 16  treatment minutes    PATTIE Dawn/MALKA #[id number]

## 2020-12-28 NOTE — PLAN OF CARE
Problem: Skin Integrity:  Goal: Will show no infection signs and symptoms  Description: Will show no infection signs and symptoms  12/28/2020 0320 by Aye Gamboa RN  Outcome: Met This Shift     Problem: Skin Integrity:  Goal: Absence of new skin breakdown  Description: Absence of new skin breakdown  Outcome: Met This Shift     Problem: Falls - Risk of:  Goal: Will remain free from falls  Description: Will remain free from falls  12/28/2020 0320 by Aye Gamboa RN  Outcome: Met This Shift     Problem: Falls - Risk of:  Goal: Absence of physical injury  Description: Absence of physical injury  12/28/2020 0320 by Aye Gamboa RN  Outcome: Met This Shift

## 2020-12-29 VITALS
SYSTOLIC BLOOD PRESSURE: 137 MMHG | TEMPERATURE: 97.3 F | OXYGEN SATURATION: 97 % | HEIGHT: 63 IN | HEART RATE: 95 BPM | RESPIRATION RATE: 20 BRPM | BODY MASS INDEX: 21.27 KG/M2 | DIASTOLIC BLOOD PRESSURE: 65 MMHG | WEIGHT: 120.06 LBS

## 2020-12-29 LAB
METER GLUCOSE: 157 MG/DL (ref 74–99)
METER GLUCOSE: 182 MG/DL (ref 74–99)

## 2020-12-29 PROCEDURE — 6370000000 HC RX 637 (ALT 250 FOR IP): Performed by: INTERNAL MEDICINE

## 2020-12-29 PROCEDURE — 2700000000 HC OXYGEN THERAPY PER DAY

## 2020-12-29 PROCEDURE — 2580000003 HC RX 258: Performed by: INTERNAL MEDICINE

## 2020-12-29 PROCEDURE — 2500000003 HC RX 250 WO HCPCS: Performed by: INTERNAL MEDICINE

## 2020-12-29 PROCEDURE — 6360000002 HC RX W HCPCS: Performed by: INTERNAL MEDICINE

## 2020-12-29 PROCEDURE — 82962 GLUCOSE BLOOD TEST: CPT

## 2020-12-29 RX ORDER — RISPERIDONE 0.5 MG/1
0.5 TABLET, FILM COATED ORAL NIGHTLY
Status: DISCONTINUED | OUTPATIENT
Start: 2020-12-29 | End: 2020-12-29 | Stop reason: HOSPADM

## 2020-12-29 RX ORDER — ACETAMINOPHEN 500 MG
500 TABLET ORAL NIGHTLY PRN
Status: DISCONTINUED | OUTPATIENT
Start: 2020-12-29 | End: 2020-12-29 | Stop reason: HOSPADM

## 2020-12-29 RX ORDER — DIPHENHYDRAMINE HCL 25 MG
25 TABLET ORAL NIGHTLY PRN
Status: DISCONTINUED | OUTPATIENT
Start: 2020-12-29 | End: 2020-12-29 | Stop reason: HOSPADM

## 2020-12-29 RX ORDER — LANOLIN ALCOHOL/MO/W.PET/CERES
6 CREAM (GRAM) TOPICAL NIGHTLY
Status: DISCONTINUED | OUTPATIENT
Start: 2020-12-29 | End: 2020-12-29 | Stop reason: HOSPADM

## 2020-12-29 RX ADMIN — INSULIN LISPRO 3 UNITS: 100 INJECTION, SOLUTION INTRAVENOUS; SUBCUTANEOUS at 12:09

## 2020-12-29 RX ADMIN — Medication 1 TABLET: at 08:44

## 2020-12-29 RX ADMIN — Medication 10 ML: at 14:28

## 2020-12-29 RX ADMIN — DOXYCYCLINE 100 MG: 100 INJECTION, POWDER, LYOPHILIZED, FOR SOLUTION INTRAVENOUS at 14:26

## 2020-12-29 RX ADMIN — ENOXAPARIN SODIUM 30 MG: 30 INJECTION SUBCUTANEOUS at 08:44

## 2020-12-29 RX ADMIN — AMLODIPINE BESYLATE 10 MG: 10 TABLET ORAL at 08:44

## 2020-12-29 RX ADMIN — CALCIUM POLYCARBOPHIL 625 MG: 625 TABLET, FILM COATED ORAL at 08:44

## 2020-12-29 RX ADMIN — LEVOTHYROXINE SODIUM 25 MCG: 0.03 TABLET ORAL at 05:46

## 2020-12-29 RX ADMIN — MEMANTINE HYDROCHLORIDE 5 MG: 5 TABLET, FILM COATED ORAL at 08:44

## 2020-12-29 RX ADMIN — ASPIRIN 81 MG: 81 TABLET, COATED ORAL at 08:44

## 2020-12-29 RX ADMIN — VILAZODONE HYDROCHLORIDE 40 MG: 40 TABLET ORAL at 12:16

## 2020-12-29 RX ADMIN — INSULIN GLARGINE 10 UNITS: 100 INJECTION, SOLUTION SUBCUTANEOUS at 08:27

## 2020-12-29 RX ADMIN — INSULIN LISPRO 3 UNITS: 100 INJECTION, SOLUTION INTRAVENOUS; SUBCUTANEOUS at 06:58

## 2020-12-29 RX ADMIN — Medication 2000 UNITS: at 08:44

## 2020-12-29 RX ADMIN — DEXAMETHASONE 6 MG: 4 TABLET ORAL at 08:44

## 2020-12-29 ASSESSMENT — PAIN SCALES - GENERAL: PAINLEVEL_OUTOF10: 0

## 2020-12-29 NOTE — CARE COORDINATION
Pt discharging home today. Pt needd hhc orders. Called Kamryn(sister) to update on FWW and hhc arrangements, no answer will call back. Sherice Jacinto LSs FWW, spoke with Sari) to order FWW, it will be delivered to home. Called and spoke with Dawit([facility]) to notify of discharge and need of FWW. 2:20pm called sister and spoke with her updated her on pt's need for FWW and the order for [address] to pick pt up later afternoon, she will call floor and let them know a time. Sherice CORONEL

## 2020-12-29 NOTE — PROGRESS NOTES
[facility]   Department of Pharmacy   Pharmacist Transition of Care Services         Patient Demographics  Name:  Sanjay Atkinson   Medical Record Number:  [id number]  Gender:  female   Age:  80 y.o. YOB: 1926    Readmission Risk: 18%       Pharmacist Review and Summary of Medications     Date of last reviewed/update: 12/29/20     Category Comments   New Medication Started        Change in Outpatient Medication      Discontinued/On hold Outpatient Medication       Other   No changes to medications for discharge       Documentation of Pharmacist Interventions and Follow-up Plan:     The following Pharmacist Transition of Care Services were completed:   [x]  Reviewed and summarized medication changes  [x]  Entire home medication list was reviewed for accuracy  [x]  Home medication list was updated or corrected    [x]  Reviewed discharge medication reconciliation  []  Discharge medication list was updated or corrected    [x]  Added information to AVS   []  Patient education was provided on new medications  []  Patient education was provided on medication changes  []  Reviewed the AVS with patient    Additional Interventions:  []  Inpatient prescriber was contacted and the following pharmacy recommendations        were accepted: **     [] Other interventions: **        Pharmacist: Lance Hinojosa PharmD, [facility]  Date:  12/29/2020 2:25 PM  Time spent counseling patient face-to-face OR over the phone on medications: 0 minutes

## 2020-12-29 NOTE — PROGRESS NOTES
CLINICAL PHARMACY: DISCHARGE MED RECONCILIATION/REVIEW    [facility]) Select Patient?: No  Total # of Interventions Recommended: 0   -   Total # Interventions Accepted: 0  Intervention Severity:   - Level 1 Intervention Present?: No   - Level 2 #: 0   - Level 3 #: 0   Time Spent (min): 15    Additional Documentation: See progress note. Did update home med list prior to discharge med rec.

## 2020-12-29 NOTE — PROGRESS NOTES
oil-hydrophilic petrolatum, sodium chloride, sodium chloride flush, promethazine **OR** ondansetron, polyethylene glycol, acetaminophen **OR** acetaminophen, glucose, dextrose, glucagon (rDNA), dextrose    Allergies:  Patient has no known allergies. Social History:   Social History     Socioeconomic History    Marital status:      Spouse name: Not on file    Number of children: Not on file    Years of education: Not on file    Highest education level: Not on file   Occupational History    Not on file   Social Needs    Financial resource strain: Not on file    Food insecurity     Worry: Not on file     Inability: Not on file    Transportation needs     Medical: Not on file     Non-medical: Not on file   Tobacco Use    Smoking status: Never Smoker    Smokeless tobacco: Never Used   Substance and Sexual Activity    Alcohol use: No    Drug use: No    Sexual activity: Not on file   Lifestyle    Physical activity     Days per week: Not on file     Minutes per session: Not on file    Stress: Not on file   Relationships    Social connections     Talks on phone: Not on file     Gets together: Not on file     Attends Hinduism service: Not on file     Active member of club or organization: Not on file     Attends meetings of clubs or organizations: Not on file     Relationship status: Not on file    Intimate partner violence     Fear of current or ex partner: Not on file     Emotionally abused: Not on file     Physically abused: Not on file     Forced sexual activity: Not on file   Other Topics Concern    Not on file   Social History Narrative    Not on file     Tobacco: No  Alcohol: No  Pets: No  Travel: No    Family History:   No family history on file. . Otherwise non-pertinent to the chief complaint. REVIEW OF SYSTEMS:    CONSTITUTIONAL:  No chills, fevers or night sweats. No loss of weight. EYES:  No double vision or drainage from eyes, ears or throat. HEENT:  No neck stiffness.  No dysphagia. No drainage from eyes, ears or throat  RESPIRATORY:  No cough, productive sputum or hemoptysis. CARDIOVASCULAR:  No chest pain, palpitations, orthopnea or dyspnea on exertion. GASTROINTESTINAL:  No nausea, vomiting, diarrhea or constipation or hematochezia   GENITOURINARY:  No frequency burning dysuria or hematuria. INTEGUMENT/BREAST:  No rash or breast masses. HEMATOLOGIC/LYMPHATIC:  No lymphadenopathy or blood dyscrasics. ALLERGIC/IMMUNOLOGIC:  No anaphylaxis. ENDOCRINE:  No polyuria or polydipsia or temperature intolerance. MUSCULOSKELETAL:  No myalgia or arthralgia. Full ROM. NEUROLOGICAL:  No focal motor sensory deficit. BEHAVIOR/PSYCH:  No psychosis. PHYSICAL EXAM:    Vitals:    /65   Pulse 95   Temp 97.3 °F (36.3 °C) (Temporal)   Resp 20   Ht 5' 3\" (1.6 m)   Wt 120 lb 1 oz (54.5 kg)   LMP 08/01/2015   SpO2 97%   BMI 21.27 kg/m²   Constitutional: The patient is awake, alert, and oriented. Skin: Warm and dry. No rashes were noted. HEENT: Eyes show round, and reactive pupils. No jaundice. Moist mucous membranes, no ulcerations, no thrush. Neck: Supple to movements. No lymphadenopathy. Chest: No use of accessory muscles to breathe. Symmetrical expansion. Auscultation reveals no wheezing, crackles, or rhonchi. Cardiovascular: S1 and S2 are rhythmic and regular. No murmurs appreciated. Abdomen: Positive bowel sounds to auscultation. Benign to palpation. No masses felt. No hepatosplenomegaly. Extremities: No clubbing, no cyanosis, no edema. Lines: peripheral      CBC+dif:  No results for input(s): WBC, HGB, HCT, MCV, PLT, NEUTROABS in the last 72 hours.   No results found for: CRP  No results found for: CRPHS  No results found for: SEDRATE  Lab Results   Component Value Date    ALT 36 (H) 12/27/2020    AST 51 (H) 12/27/2020    ALKPHOS 59 12/27/2020    BILITOT 0.4 12/27/2020     Lab Results   Component Value Date     12/27/2020    K 3.8 12/27/2020    K 3.9 12/25/2020    CL 97 12/27/2020    CO2 22 12/27/2020    BUN 14 12/27/2020    CREATININE 0.5 12/27/2020    GFRAA >60 12/27/2020    LABGLOM >60 12/27/2020    GLUCOSE 268 12/27/2020    PROT 6.7 12/27/2020    LABALBU 3.2 12/27/2020    CALCIUM 9.6 12/27/2020    BILITOT 0.4 12/27/2020    ALKPHOS 59 12/27/2020    AST 51 12/27/2020    ALT 36 12/27/2020       No results found for: PROTIME, INR    Lab Results   Component Value Date    TSH 2.500 12/24/2020       Lab Results   Component Value Date    COLORU Yellow 12/24/2020    PHUR 6.0 12/24/2020    WBCUA 0-1 12/24/2020    RBCUA 0-1 12/24/2020    BACTERIA RARE 12/24/2020    CLARITYU Clear 12/24/2020    SPECGRAV >=1.030 12/24/2020    LEUKOCYTESUR Negative 12/24/2020    UROBILINOGEN 0.2 12/24/2020    BILIRUBINUR Negative 12/24/2020    BLOODU Negative 12/24/2020    GLUCOSEU 250 12/24/2020       No results found for: [place], BEART, I3ELOLTD, PHART, THGBART, TJL1CZD, PO2ART, [place]  Radiology:  CTA CHEST W CONTRAST   Final Result   No evidence of pulmonary emboli. Multifocal airspace disease in the lungs bilaterally, consistent with   multifocal pneumonia and suspicious for COVID-19 pneumonia. Nonspecific small hilar and mediastinal nodes, which may be reactive. Mild cardiomegaly with coronary atherosclerotic calcifications. XR CHEST PORTABLE   Final Result   Patchy multifocal infiltrates concerning for pneumonia. Microbiology:  Pending  No results for input(s): BC in the last 72 hours. No results for input(s): ORG in the last 72 hours. No results for input(s): May Hairston in the last 72 hours. No results for input(s): STREPNEUMAGU in the last 72 hours. No results for input(s): LP1UAG in the last 72 hours. No results for input(s): ASO in the last 72 hours. No results for input(s): CULTRESP in the last 72 hours.     Assessment:  · COVID 19 suspicion  · Agree with remdisivir and dexamethasone    · She was delightful !!!      Plan:    · Cont rem and dexamethasone  · Agree with ceftriaxone as you are doing   · Check cultures  · Baseline ESR, CRP  · Monitor labs  · Will follow with you  · Urine antigens are pending   · Stay the course for now  · Dr Jodie Encinas' noted  · Second covid test negative   CT hightly suggestive  · Long talk with Dr. Jodie Encinas   Would stay the course for now  · Dr Jodie Encinas to discharge her today   [facility] with that    Thank you for having us see this patient in consultation. I will be discussing this case with the treating physicians.       Electronically signed by Georgia Thomson MD on 12/29/2020 at 11:37 AM

## 2020-12-30 ENCOUNTER — CARE COORDINATION (OUTPATIENT)
Dept: CARE COORDINATION | Age: 85
End: 2020-12-30

## 2020-12-30 LAB
BLOOD CULTURE, ROUTINE: NORMAL
CULTURE, BLOOD 2: NORMAL

## 2020-12-30 NOTE — CARE COORDINATION
Patient contacted regarding [id number] diagnosis\". Discussed COVID-19 related testing which was available at this time. Test results were positive. Patient informed of results, if available? Yes    Care Transition Nurse/ Ambulatory Care Manager contacted the caregiver by telephone to perform post discharge assessment. Call within 2 business days of discharge: Yes. Verified name and  with caregiver as identifiers. Provided introduction to self, and explanation of the CTN/ACM role, and reason for call due to risk factors for infection and/or exposure to COVID-19. Symptoms reviewed with caregiver who verbalized the following symptoms: fatigue, no new symptoms and no worsening symptoms. Due to no new or worsening symptoms encounter was not routed to provider for escalation. Discussed follow-up appointments. If no appointment was previously scheduled, appointment scheduling offered: [facility] follow up appointment(s): No future appointments. Non-[facility] follow up appointment(s):     Non-face-to-face services provided:  Obtained and reviewed discharge summary and/or continuity of care documents     Advance Care Planning:   Does patient have an Advance Directive:  did not review. Patient has following risk factors of: diabetes. CTN/ACM reviewed discharge instructions, medical action plan and red flags such as increased shortness of breath, increasing fever and signs of decompensation with caregiver who verbalized understanding. Discussed exposure protocols and quarantine with CDC Guidelines What to do if you are sick with coronavirus disease .  Caregiver was given an opportunity for questions and concerns. The caregiver agrees to contact the [facility] exposure line [phone], local [facility] department [place] Department of Health: ([phone]) and PCP office for questions related to their healthcare. CTN/ACM provided contact information for future needs.     Reviewed and educated caregiver on any new and

## 2021-01-07 ENCOUNTER — CARE COORDINATION (OUTPATIENT)
Dept: CASE MANAGEMENT | Age: 86
End: 2021-01-07

## 2021-01-07 NOTE — CARE COORDINATION
[place] [address] Transitions Follow Up Call    2021    Patient: Dara Rios  Patient : 1926   MRN: [id number]  Reason for Admission: Acute respiratory failure with hypoxia  Discharge Date: 20 RARS: Readmission Risk Score: 18         Spoke with: Dara Rios, patient and patient's cg Jomar Ramirez with permission of patient    Patient resolved from the Care Transitions episode on 21    Patient/family has been provided the following resources and education related to COVID-19:                         Signs, symptoms and red flags related to COVID-19            CDC exposure and quarantine guidelines            [facility] exposure contact - [phone]            Contact for their local Department of Health               Patient denies any s/s of covid-19. Per Jomar Ramirez, patient is eating and hydrating with decaf products. [facility] home care active in the home and performed snv today. Patient having urinary issues at this time, sensation of needing to urinate frequently with malodorous urine. Home Care has contacted physician. Patient is ambulating with ww. Patient's cg denies any further needs, questions, or concerns at this time. No further outreach scheduled with this CTN/ACM. Episode of Care resolved. Patient has this CTN/ACM contact information if future needs arise. Follow Up  No future appointments.     Hanh Pittman RN

## 2021-01-30 NOTE — DISCHARGE SUMMARY
vilazodone HCl (VIIBRYD) 40 MG TABS Take 40 mg by mouth Daily with lunchHistorical Med      Plecanatide (TRULANCE) 3 MG TABS Take 3 mg by mouth daily as needed (Trulance)Historical Med      diphenhydrAMINE-APAP, sleep, (TYLENOL PM EXTRA STRENGTH)  MG tablet Take 1 tablet by mouth nightly as needed for SleepHistorical Med      Cholecalciferol (VITAMIN D3) 50 MCG (2000 UT) CAPS Take 2,000 Units by mouth dailyHistorical Med      Cyanocobalamin (B-12) 5000 MCG CAPS Take 5,000 mcg by mouth dailyHistorical Med      !! Multiple Vitamins-Minerals (PRESERVISION AREDS) TABS Take 1 tablet by mouth dailyHistorical Med      !! Multiple Vitamins-Minerals (CENTRUM SILVER ADULT 50+) TABS Take 1 tablet by mouth dailyHistorical Med      Omega-3 Fatty Acids (FISH OIL) 1200 MG CAPS Take 1,200 mg by mouth dailyHistorical Med      memantine (NAMENDA) 5 MG tablet Take 5 mg by mouth 2 times daily Historical Med      polycarbophil (FIBERCON) 625 MG tablet Take 625 mg by mouth dailyHistorical Med      aspirin 81 MG EC tablet Take 1 tablet by mouth daily, Disp-30 tablet, R-3Print      amLODIPine (NORVASC) 10 MG tablet Take 10 mg by mouth dailyHistorical Med      levothyroxine (SYNTHROID) 25 MCG tablet Take 25 mcg by mouth DailyHistorical Med       !! - Potential duplicate medications found. Please discuss with provider. Time Spent on discharge is more than 45 minutes in the examination, evaluation, counseling and review of medications and discharge plan.       Signed:    Corky Katz MD   1/30/2021

## 2021-05-07 ENCOUNTER — OFFICE VISIT (OUTPATIENT)
Dept: FAMILY MEDICINE CLINIC | Age: 86
End: 2021-05-07
Payer: MEDICARE

## 2021-05-07 VITALS
BODY MASS INDEX: 19.49 KG/M2 | HEIGHT: 63 IN | DIASTOLIC BLOOD PRESSURE: 78 MMHG | RESPIRATION RATE: 16 BRPM | OXYGEN SATURATION: 96 % | WEIGHT: 110 LBS | HEART RATE: 70 BPM | SYSTOLIC BLOOD PRESSURE: 118 MMHG | TEMPERATURE: 97 F

## 2021-05-07 DIAGNOSIS — E55.9 VITAMIN D DEFICIENCY: ICD-10-CM

## 2021-05-07 DIAGNOSIS — E03.9 HYPOTHYROIDISM, UNSPECIFIED TYPE: ICD-10-CM

## 2021-05-07 DIAGNOSIS — R35.0 URINARY FREQUENCY: ICD-10-CM

## 2021-05-07 DIAGNOSIS — I10 HYPERTENSION, UNSPECIFIED TYPE: Primary | ICD-10-CM

## 2021-05-07 DIAGNOSIS — E11.9 TYPE 2 DIABETES MELLITUS WITHOUT COMPLICATION, WITHOUT LONG-TERM CURRENT USE OF INSULIN (HCC): ICD-10-CM

## 2021-05-07 LAB
CHP ED QC CHECK: NORMAL
GLUCOSE BLD-MCNC: 173 MG/DL

## 2021-05-07 PROCEDURE — 99203 OFFICE O/P NEW LOW 30 MIN: CPT | Performed by: INTERNAL MEDICINE

## 2021-05-07 PROCEDURE — 82962 GLUCOSE BLOOD TEST: CPT | Performed by: INTERNAL MEDICINE

## 2021-05-07 SDOH — ECONOMIC STABILITY: FOOD INSECURITY: WITHIN THE PAST 12 MONTHS, THE FOOD YOU BOUGHT JUST DIDN'T LAST AND YOU DIDN'T HAVE MONEY TO GET MORE.: NEVER TRUE

## 2021-05-07 SDOH — ECONOMIC STABILITY: TRANSPORTATION INSECURITY
IN THE PAST 12 MONTHS, HAS THE LACK OF TRANSPORTATION KEPT YOU FROM MEDICAL APPOINTMENTS OR FROM GETTING MEDICATIONS?: NO

## 2021-05-07 SDOH — ECONOMIC STABILITY: FOOD INSECURITY: WITHIN THE PAST 12 MONTHS, YOU WORRIED THAT YOUR FOOD WOULD RUN OUT BEFORE YOU GOT MONEY TO BUY MORE.: NEVER TRUE

## 2021-05-07 SDOH — ECONOMIC STABILITY: INCOME INSECURITY: HOW HARD IS IT FOR YOU TO PAY FOR THE VERY BASICS LIKE FOOD, HOUSING, MEDICAL CARE, AND HEATING?: NOT ASKED

## 2021-05-07 ASSESSMENT — PATIENT HEALTH QUESTIONNAIRE - PHQ9
2. FEELING DOWN, DEPRESSED OR HOPELESS: 0
SUM OF ALL RESPONSES TO PHQ QUESTIONS 1-9: 0

## 2021-05-08 NOTE — PROGRESS NOTES
Patient:  Donna Samuel  MRN: [id number]  Date of Service: 2021   1926      CHIEF COMPLAINT:    Chief Complaint   Patient presents with    New Patient     trying to get a PCP. Gus Andersen concerns of frequent urination wants to see dr before a urine i collected        History Obtained From:  Patient and her sister. HISTORY OF PRESENT ILLNESS:   The patient is a 80 y.o. female with prior history of Hypertension,hypothyroidism,[place],Urinary frequency and Type 2 Diabetes Mellitus is here for a general check up. She wants to establish as a new patient in our clinic. Formerly she was under care of Dr Tim Neri. She had a Thyroid surgery at age 25  She is under care of Dr Edwardo Blanchard and she takes Risperdal.  Past medical, surgical and family history reviewed and updated. Medications, allergies, and social history reviewed and updated. ROS:  Negative except for some insomnia  History obtained from the patient and her sister. General ROS: negative  Psychological ROS: negative  Ophthalmic ROS: negative  ENT ROS: Negative  Allergy and Immunology ROS: Negative  Hematological and Lymphatic ROS: Negative}  Endocrine ROS: Positive for Hypothyroidism and Diabetes Mellitus. Breast ROS: Negative  Respiratory ROS: Negative  Cardiovascular ROS: negative  Gastrointestinal ROS: negative  Genito-Urinary ROS: Positive for urinary frequency  Musculoskeletal ROS: negative  Neurological ROS: negative except for insonmia  Dermatological ROS: negative    Physical Exam:      General appearance: alert, appears stated age and cooperative  Vitals:   Vitals:    21 1305   BP: 118/78   Site: Left Upper Arm   Pulse: 70   Resp: 16   Temp: 97 °F (36.1 °C)   TempSrc: Temporal   SpO2: 96%   Weight: 110 lb (49.9 kg)   Height: 5' 3\" (1.6 m)     Skin: Skin color, texture, turgor normal. No rashes or lesions.   HEENT: Head: Normocephalic, without obvious abnormality, atraumatic  Neck: no adenopathy, no carotid bruit, no JVD, supple, symmetrical, trachea midline and thyroid not enlarged, symmetric, no tenderness/mass/nodules  Lungs: clear to auscultation bilaterally  Heart: regular rate and rhythm, S1, S2 normal, no murmur, click, rub or gallop  Abdomen: soft, non-tender; bowel sounds normal; no masses,  no organomegaly  Extremities: extremities normal, atraumatic, no cyanosis or edema  Neurologic: Mental status: Alert, oriented, thought content appropriate    Labs:  CBC:   Lab Results   Component Value Date    WBC 8.5 12/25/2020    RBC 4.39 12/25/2020    HGB 12.9 12/25/2020    HCT 38.0 12/25/2020    MCV 86.6 12/25/2020    MCH 29.4 12/25/2020    MCHC 33.9 12/25/2020    RDW 12.8 12/25/2020     12/25/2020    MPV 10.4 12/25/2020     WBC:    Lab Results   Component Value Date    WBC 8.5 12/25/2020     CMP:    Lab Results   Component Value Date     12/27/2020    K 3.8 12/27/2020    K 3.9 12/25/2020    CL 97 12/27/2020    CO2 22 12/27/2020    BUN 14 12/27/2020    CREATININE 0.5 12/27/2020    GFRAA >60 12/27/2020    LABGLOM >60 12/27/2020    GLUCOSE 173 05/07/2021    PROT 6.7 12/27/2020    LABALBU 3.2 12/27/2020    CALCIUM 9.6 12/27/2020    BILITOT 0.4 12/27/2020    ALKPHOS 59 12/27/2020    AST 51 12/27/2020    ALT 36 12/27/2020     Calcium:    Lab Results   Component Value Date    CALCIUM 9.6 12/27/2020     Warfarin PT/INR:  No components found for: PTPATWAR, PTINRWAR  Troponin:    Lab Results   Component Value Date    TROPONINI <0.01 12/24/2020     VBG:    Lab Results   Component Value Date    PHVEN 7.37 05/11/2019     HgBA1c:  No components found for: HGBA1C   -----------------------------------------------------------------  EKG: Not indicated today      Assessment and Tiffany was seen today for new patient. Diagnoses and all orders for this visit:    Hypertension, unspecified type  -     CBC Auto Differential; Future  -     COMPREHENSIVE METABOLIC PANEL; Future  Dose of Amlodipine reviewed.   Urinary frequency  - CBC Auto Differential; Future  -     Culture, Urine; Future  -     URINALYSIS; Future  We will check Urinalysis and a culture to rule out infection  Consider Urology eval if symptoms continue  Type 2 diabetes mellitus without complication, without long-term current use of insulin (HCC)  -     POCT Glucose  -     Hemoglobin A1C; Future  -     VITAMIN B12; Future  Avoid sugar and sweets. Medications reviewed. It is possible that if Diabetes gets controlled the urinary frequency may improve. Hypothyroidism, unspecified type  -     LIPID PANEL; Future  -     TSH; Future  Dose of synthroid reviewed. Vitamin D deficiency  -     Vitamin D 25 Hydroxy; Future  Pt educated on Vitamin D deficiency and role of Cholecalciferol reviewed        Labs reviewed with patient. Medications reviewed with patient. All questions answered. Return to clinic in a month.     Estuardo Del Valle  6:35 PM  5/8/2021

## 2021-05-10 DIAGNOSIS — R35.0 URINARY FREQUENCY: ICD-10-CM

## 2021-05-10 LAB
BACTERIA: ABNORMAL /HPF
BILIRUBIN URINE: NEGATIVE
BLOOD, URINE: NEGATIVE
CLARITY: CLEAR
COLOR: YELLOW
CRYSTALS, UA: ABNORMAL /HPF
GLUCOSE URINE: NEGATIVE MG/DL
KETONES, URINE: NEGATIVE MG/DL
LEUKOCYTE ESTERASE, URINE: ABNORMAL
NITRITE, URINE: NEGATIVE
PH UA: 5.5 (ref 5–9)
PROTEIN UA: NEGATIVE MG/DL
RBC UA: ABNORMAL /HPF (ref 0–2)
SPECIFIC GRAVITY UA: 1.02 (ref 1–1.03)
UROBILINOGEN, URINE: 0.2 E.U./DL
WBC UA: ABNORMAL /HPF (ref 0–5)

## 2021-05-12 ENCOUNTER — NURSE ONLY (OUTPATIENT)
Dept: FAMILY MEDICINE CLINIC | Age: 86
End: 2021-05-12
Payer: MEDICARE

## 2021-05-12 DIAGNOSIS — E55.9 VITAMIN D DEFICIENCY: ICD-10-CM

## 2021-05-12 DIAGNOSIS — E03.9 HYPOTHYROIDISM, UNSPECIFIED TYPE: ICD-10-CM

## 2021-05-12 DIAGNOSIS — R35.0 URINARY FREQUENCY: ICD-10-CM

## 2021-05-12 DIAGNOSIS — I10 HYPERTENSION, UNSPECIFIED TYPE: ICD-10-CM

## 2021-05-12 DIAGNOSIS — E03.9 HYPOTHYROIDISM, UNSPECIFIED TYPE: Chronic | ICD-10-CM

## 2021-05-12 DIAGNOSIS — E11.9 TYPE 2 DIABETES MELLITUS WITHOUT COMPLICATION, WITHOUT LONG-TERM CURRENT USE OF INSULIN (HCC): ICD-10-CM

## 2021-05-12 DIAGNOSIS — I10 HYPERTENSION, UNSPECIFIED TYPE: Chronic | ICD-10-CM

## 2021-05-12 DIAGNOSIS — E11.9 CONTROLLED TYPE 2 DIABETES MELLITUS WITHOUT COMPLICATION, WITHOUT LONG-TERM CURRENT USE OF INSULIN (HCC): Chronic | ICD-10-CM

## 2021-05-12 LAB
ALBUMIN SERPL-MCNC: 4.3 G/DL (ref 3.5–5.2)
ALP BLD-CCNC: 36 U/L (ref 35–104)
ALT SERPL-CCNC: 22 U/L (ref 0–32)
ANION GAP SERPL CALCULATED.3IONS-SCNC: 14 MMOL/L (ref 7–16)
AST SERPL-CCNC: 27 U/L (ref 0–31)
BASOPHILS ABSOLUTE: 0.07 E9/L (ref 0–0.2)
BASOPHILS RELATIVE PERCENT: 0.7 % (ref 0–2)
BILIRUB SERPL-MCNC: 0.5 MG/DL (ref 0–1.2)
BUN BLDV-MCNC: 12 MG/DL (ref 6–23)
CALCIUM SERPL-MCNC: 10.6 MG/DL (ref 8.6–10.2)
CHLORIDE BLD-SCNC: 102 MMOL/L (ref 98–107)
CHOLESTEROL, TOTAL: 160 MG/DL (ref 0–199)
CO2: 24 MMOL/L (ref 22–29)
CREAT SERPL-MCNC: 0.7 MG/DL (ref 0.5–1)
EOSINOPHILS ABSOLUTE: 0.11 E9/L (ref 0.05–0.5)
EOSINOPHILS RELATIVE PERCENT: 1.2 % (ref 0–6)
GFR AFRICAN AMERICAN: >60
GFR NON-AFRICAN AMERICAN: >60 ML/MIN/1.73
GLUCOSE BLD-MCNC: 233 MG/DL (ref 74–99)
HBA1C MFR BLD: 8.9 % (ref 4–5.6)
HCT VFR BLD CALC: 43.1 % (ref 34–48)
HDLC SERPL-MCNC: 52 MG/DL
HEMOGLOBIN: 13.8 G/DL (ref 11.5–15.5)
IMMATURE GRANULOCYTES #: 0.05 E9/L
IMMATURE GRANULOCYTES %: 0.5 % (ref 0–5)
LDL CHOLESTEROL CALCULATED: 54 MG/DL (ref 0–99)
LYMPHOCYTES ABSOLUTE: 1.41 E9/L (ref 1.5–4)
LYMPHOCYTES RELATIVE PERCENT: 15.1 % (ref 20–42)
MCH RBC QN AUTO: 29.1 PG (ref 26–35)
MCHC RBC AUTO-ENTMCNC: 32 % (ref 32–34.5)
MCV RBC AUTO: 90.9 FL (ref 80–99.9)
MONOCYTES ABSOLUTE: 0.61 E9/L (ref 0.1–0.95)
MONOCYTES RELATIVE PERCENT: 6.5 % (ref 2–12)
NEUTROPHILS ABSOLUTE: 7.1 E9/L (ref 1.8–7.3)
NEUTROPHILS RELATIVE PERCENT: 76 % (ref 43–80)
PDW BLD-RTO: 13.6 FL (ref 11.5–15)
PLATELET # BLD: 256 E9/L (ref 130–450)
PMV BLD AUTO: 11.1 FL (ref 7–12)
POTASSIUM SERPL-SCNC: 4.4 MMOL/L (ref 3.5–5)
RBC # BLD: 4.74 E12/L (ref 3.5–5.5)
SODIUM BLD-SCNC: 140 MMOL/L (ref 132–146)
TOTAL PROTEIN: 7.1 G/DL (ref 6.4–8.3)
TRIGL SERPL-MCNC: 271 MG/DL (ref 0–149)
TSH SERPL DL<=0.05 MIU/L-ACNC: 3.15 UIU/ML (ref 0.27–4.2)
VITAMIN B-12: >2000 PG/ML (ref 211–946)
VITAMIN D 25-HYDROXY: 33 NG/ML (ref 30–100)
VLDLC SERPL CALC-MCNC: 54 MG/DL
WBC # BLD: 9.4 E9/L (ref 4.5–11.5)

## 2021-05-12 PROCEDURE — 36415 COLL VENOUS BLD VENIPUNCTURE: CPT | Performed by: INTERNAL MEDICINE

## 2021-05-24 RX ORDER — AMLODIPINE BESYLATE 10 MG/1
10 TABLET ORAL DAILY
Qty: 30 TABLET | Refills: 3 | Status: SHIPPED
Start: 2021-05-24 | End: 2021-09-22 | Stop reason: SDUPTHER

## 2021-06-16 ENCOUNTER — TELEPHONE (OUTPATIENT)
Dept: FAMILY MEDICINE CLINIC | Age: 86
End: 2021-06-16

## 2021-06-16 NOTE — TELEPHONE ENCOUNTER
Sister called to speak with you personally for advice re: possible MRSA exposure from caretaker who bathes her. Caretaker had nasal swab done at hospital for pre op exam is currently being treated. PATIENT IS FINE AND HAS NO COMPLAINTS.      Holli Coleman [phone]

## 2021-07-20 DIAGNOSIS — E11.9 CONTROLLED TYPE 2 DIABETES MELLITUS WITHOUT COMPLICATION, WITHOUT LONG-TERM CURRENT USE OF INSULIN (HCC): Primary | ICD-10-CM

## 2021-07-20 RX ORDER — GLUCOSAMINE HCL/CHONDROITIN SU 500-400 MG
CAPSULE ORAL
Qty: 100 STRIP | Refills: 3 | Status: CANCELLED | OUTPATIENT
Start: 2021-07-20

## 2021-07-22 RX ORDER — GLUCOSAMINE HCL/CHONDROITIN SU 500-400 MG
CAPSULE ORAL
Qty: 100 STRIP | Refills: 5 | Status: SHIPPED
Start: 2021-07-22 | End: 2022-08-01

## 2021-08-18 RX ORDER — LEVOTHYROXINE SODIUM 0.03 MG/1
25 TABLET ORAL DAILY
Qty: 30 TABLET | Refills: 2 | Status: SHIPPED
Start: 2021-08-18 | End: 2021-11-15

## 2021-09-08 ENCOUNTER — TELEPHONE (OUTPATIENT)
Dept: FAMILY MEDICINE CLINIC | Age: 86
End: 2021-09-08

## 2021-09-08 DIAGNOSIS — E11.9 TYPE 2 DIABETES MELLITUS WITHOUT COMPLICATION, WITHOUT LONG-TERM CURRENT USE OF INSULIN (HCC): Primary | ICD-10-CM

## 2021-09-08 NOTE — TELEPHONE ENCOUNTER
Pt's sister has called and stating pt's blood sugars have been running high last 10 days and is wondering if possibly may need adjustment in medication. Pt has had some decrease in walking. Her sugars have been running 188 to 216. Normally running between 140 to 150. Pt only taking Januvia 100mg daily. She is eating well otherwise and doing well.   Please advise

## 2021-09-08 NOTE — TELEPHONE ENCOUNTER
Let us check a fasting BMP in am and also a A1c in am  Have family check blood sugars in am and Pm.   Call us with results on Friday  Appointment to see me on Monday afternoon please

## 2021-09-13 ENCOUNTER — OFFICE VISIT (OUTPATIENT)
Dept: FAMILY MEDICINE CLINIC | Age: 86
End: 2021-09-13
Payer: MEDICARE

## 2021-09-13 VITALS
DIASTOLIC BLOOD PRESSURE: 66 MMHG | RESPIRATION RATE: 16 BRPM | BODY MASS INDEX: 21.86 KG/M2 | OXYGEN SATURATION: 95 % | WEIGHT: 123.4 LBS | SYSTOLIC BLOOD PRESSURE: 156 MMHG | HEART RATE: 109 BPM | TEMPERATURE: 98 F

## 2021-09-13 DIAGNOSIS — H92.01 EAR PAIN, RIGHT: ICD-10-CM

## 2021-09-13 DIAGNOSIS — E03.9 HYPOTHYROIDISM, UNSPECIFIED TYPE: ICD-10-CM

## 2021-09-13 DIAGNOSIS — E55.9 VITAMIN D DEFICIENCY: ICD-10-CM

## 2021-09-13 DIAGNOSIS — Z23 FLU VACCINE NEED: ICD-10-CM

## 2021-09-13 DIAGNOSIS — I10 HYPERTENSION, UNSPECIFIED TYPE: ICD-10-CM

## 2021-09-13 DIAGNOSIS — E78.5 HYPERLIPIDEMIA, UNSPECIFIED HYPERLIPIDEMIA TYPE: ICD-10-CM

## 2021-09-13 DIAGNOSIS — E11.9 TYPE 2 DIABETES MELLITUS WITHOUT COMPLICATION, WITHOUT LONG-TERM CURRENT USE OF INSULIN (HCC): Primary | ICD-10-CM

## 2021-09-13 LAB
CHP ED QC CHECK: ABNORMAL
GLUCOSE BLD-MCNC: 220 MG/DL
HBA1C MFR BLD: 7.2 %

## 2021-09-13 PROCEDURE — 99214 OFFICE O/P EST MOD 30 MIN: CPT | Performed by: INTERNAL MEDICINE

## 2021-09-13 PROCEDURE — 82962 GLUCOSE BLOOD TEST: CPT | Performed by: INTERNAL MEDICINE

## 2021-09-13 PROCEDURE — 3051F HG A1C>EQUAL 7.0%<8.0%: CPT | Performed by: INTERNAL MEDICINE

## 2021-09-13 PROCEDURE — 83036 HEMOGLOBIN GLYCOSYLATED A1C: CPT | Performed by: INTERNAL MEDICINE

## 2021-09-13 PROCEDURE — 90694 VACC AIIV4 NO PRSRV 0.5ML IM: CPT | Performed by: INTERNAL MEDICINE

## 2021-09-13 PROCEDURE — G0008 ADMIN INFLUENZA VIRUS VAC: HCPCS | Performed by: INTERNAL MEDICINE

## 2021-09-13 RX ORDER — GLIMEPIRIDE 1 MG/1
1 TABLET ORAL EVERY MORNING
Qty: 30 TABLET | Refills: 3 | Status: SHIPPED
Start: 2021-09-13 | End: 2022-01-14

## 2021-09-13 NOTE — PROGRESS NOTES
thought content appropriate    Labs:  CBC:   Lab Results   Component Value Date    WBC 9.4 05/12/2021    RBC 4.74 05/12/2021    HGB 13.8 05/12/2021    HCT 43.1 05/12/2021    MCV 90.9 05/12/2021    MCH 29.1 05/12/2021    MCHC 32.0 05/12/2021    RDW 13.6 05/12/2021     05/12/2021    MPV 11.1 05/12/2021     WBC:    Lab Results   Component Value Date    WBC 9.4 05/12/2021     Platelets:    Lab Results   Component Value Date     05/12/2021     CMP:    Lab Results   Component Value Date     09/09/2021    K 4.5 09/09/2021    K 3.9 12/25/2020     09/09/2021    CO2 21 09/09/2021    BUN 13 09/09/2021    CREATININE 0.7 09/09/2021    GFRAA >60 09/09/2021    LABGLOM >60 09/09/2021    GLUCOSE 220 09/13/2021    PROT 7.1 05/12/2021    LABALBU 4.3 05/12/2021    CALCIUM 11.3 09/09/2021    BILITOT 0.5 05/12/2021    ALKPHOS 36 05/12/2021    AST 27 05/12/2021    ALT 22 05/12/2021     BUN/Creatinine:    Lab Results   Component Value Date    BUN 13 09/09/2021    CREATININE 0.7 09/09/2021     Albumin:    Lab Results   Component Value Date    LABALBU 4.3 05/12/2021     Calcium:    Lab Results   Component Value Date    CALCIUM 11.3 09/09/2021     Ionized Calcium:  No results found for: IONCA  Uric Acid:  No results found for: Shona Alves   -----------------------------------------------------------------  EKG: Not indicated today      Assessment and Plan   Wilber Ledbetter was seen today for diabetes and otalgia. Diagnoses and all orders for this visit:    Type 2 diabetes mellitus without complication, without long-term current use of insulin (HCC)  -     glimepiride (AMARYL) 1 MG tablet; Take 1 tablet by mouth every morning  -     POCT glycosylated hemoglobin (Hb A1C)  Add Amaryl 1 mgm po daily with lunch. Hypothyroidism, unspecified type  -     TSH; Future  Dose of synthroid reviewed. Hypertension, unspecified type  -     COMPREHENSIVE METABOLIC PANEL;  Future  Blood pressure medications reviewed  Vitamin D deficiency  -     Vitamin D 25 Hydroxy; Future  Reviewed role of Cholecalciferol  Hyperlipidemia, unspecified hyperlipidemia type  -     LIPID PANEL; Future  Low fat diet. Flu vaccine need  -     INFLUENZA, QUADV, ADJUVANTED, 65 YRS =, IM, PF, PREFILL SYR, 0.5ML (FLUAD)  Side effects and benefits of Flu vaccine explained. Ear pain, right  -     neomycin-polymyxin-hydrocortisone (CORTISPORIN) 3.5-16592-8 otic solution; Place 2 drops into the right ear 2 times daily for 5 days Instill into right Ear twice a day for 5 days. Will treat with short course of Cortisporin. Other orders  -     POCT Glucose  We spoke about the elevated calcium level and to avoid calcium supplements. Labs reviewed with patient. Medications reviewed with patient. All questions answered.   Return to clinic in 3 months    Shante Bauer MD  6:30 PM  9/13/2021

## 2021-09-13 NOTE — PROGRESS NOTES
Vaccine Information Sheet, \"Influenza - Inactivated\"  given to Keila Friday, or parent/legal guardian of  Keila Friday and verbalized understanding. Patient responses:    Have you ever had a reaction to a flu vaccine? No  Are you able to eat eggs without adverse effects? Yes  Do you have any current illness? No  Have you ever had [place]illian [place] Syndrome? No    Flu vaccine given per order. Please see immunization tab.

## 2021-09-22 DIAGNOSIS — F41.9 ANXIETY: Primary | ICD-10-CM

## 2021-09-22 RX ORDER — AMLODIPINE BESYLATE 10 MG/1
10 TABLET ORAL DAILY
Qty: 30 TABLET | Refills: 3 | Status: CANCELLED | OUTPATIENT
Start: 2021-09-22

## 2021-09-22 RX ORDER — AMLODIPINE BESYLATE 10 MG/1
10 TABLET ORAL DAILY
Qty: 30 TABLET | Refills: 3 | Status: SHIPPED
Start: 2021-09-22 | End: 2021-12-21 | Stop reason: SDUPTHER

## 2021-09-22 RX ORDER — CLONAZEPAM 0.5 MG/1
0.5 TABLET ORAL 2 TIMES DAILY PRN
Qty: 60 TABLET | Refills: 1 | Status: CANCELLED | OUTPATIENT
Start: 2021-09-22 | End: 2021-10-22

## 2021-09-22 NOTE — TELEPHONE ENCOUNTER
Klonopin is prescribed by her psychiatrist Dr Shaji Carty. Please have [facility] send request to Dr Shaji Carty

## 2021-09-27 RX ORDER — ATORVASTATIN CALCIUM 20 MG/1
20 TABLET, FILM COATED ORAL DAILY
Qty: 30 TABLET | Refills: 3 | Status: SHIPPED
Start: 2021-09-27 | End: 2022-01-24

## 2021-11-15 RX ORDER — LEVOTHYROXINE SODIUM 0.03 MG/1
TABLET ORAL
Qty: 90 TABLET | Refills: 3 | Status: SHIPPED | OUTPATIENT
Start: 2021-11-15

## 2021-12-21 RX ORDER — AMLODIPINE BESYLATE 10 MG/1
10 TABLET ORAL DAILY
Qty: 30 TABLET | Refills: 3 | Status: SHIPPED
Start: 2021-12-21 | End: 2022-04-20 | Stop reason: SDUPTHER

## 2022-01-06 ENCOUNTER — TELEPHONE (OUTPATIENT)
Dept: FAMILY MEDICINE CLINIC | Age: 87
End: 2022-01-06

## 2022-01-06 NOTE — TELEPHONE ENCOUNTER
Do we have a report of the Urine culture? Did not see under labs  Please let me know urine culture report so we can place on appropriate antibiotics.

## 2022-01-06 NOTE — TELEPHONE ENCOUNTER
Patient caregiver called stating that patient tested positive for a UTI and unable to come into office, wanted medication called into [facility] on [address]

## 2022-01-07 DIAGNOSIS — N39.0 URINARY TRACT INFECTION WITHOUT HEMATURIA, SITE UNSPECIFIED: ICD-10-CM

## 2022-01-07 DIAGNOSIS — N39.0 URINARY TRACT INFECTION WITHOUT HEMATURIA, SITE UNSPECIFIED: Primary | ICD-10-CM

## 2022-01-07 RX ORDER — CIPROFLOXACIN 250 MG/1
250 TABLET, FILM COATED ORAL 2 TIMES DAILY
Qty: 14 TABLET | Refills: 0 | Status: SHIPPED | OUTPATIENT
Start: 2022-01-07 | End: 2022-01-14

## 2022-01-10 LAB
ORGANISM: ABNORMAL
URINE CULTURE, ROUTINE: ABNORMAL

## 2022-01-14 DIAGNOSIS — E11.9 TYPE 2 DIABETES MELLITUS WITHOUT COMPLICATION, WITHOUT LONG-TERM CURRENT USE OF INSULIN (HCC): ICD-10-CM

## 2022-01-14 RX ORDER — GLIMEPIRIDE 1 MG/1
1 TABLET ORAL EVERY MORNING
Qty: 30 TABLET | Refills: 5 | Status: SHIPPED
Start: 2022-01-14 | End: 2022-06-27 | Stop reason: SDUPTHER

## 2022-01-24 RX ORDER — ATORVASTATIN CALCIUM 20 MG/1
TABLET, FILM COATED ORAL
Qty: 30 TABLET | Refills: 3 | Status: SHIPPED
Start: 2022-01-24 | End: 2022-05-16

## 2022-01-24 NOTE — TELEPHONE ENCOUNTER
Last Appointment:  9/13/2021  Future Appointments   Date Time Provider Izabela Vance   1/26/2022 10:20 AM SCHEDULE, [facility] [place] [facility] [place]

## 2022-02-15 ENCOUNTER — TELEPHONE (OUTPATIENT)
Dept: FAMILY MEDICINE CLINIC | Age: 87
End: 2022-02-15

## 2022-02-15 NOTE — TELEPHONE ENCOUNTER
Manjinder Mejia called and said that Keon Mason is still having pressure when urinating she has a UTI and has been taking cipro since 2/10/2022. She is also inquiring about at home labs. She would like to schedule a virtual appointment with you. Jitendra Galarza was last seen in the office 9/13/2022. please call and advise. [phone]    Manjinder Mejia called back and further informed me that they gave her a at home uti test that was positive and are giving her the remaining cipro from a past UTI. She would like an order put in for a UTI she will send someone here to get it and return her urine sample to be tested and would like you to prescribe the necessary antibiotic.

## 2022-02-16 ENCOUNTER — TELEPHONE (OUTPATIENT)
Dept: FAMILY MEDICINE CLINIC | Age: 87
End: 2022-02-16

## 2022-02-16 RX ORDER — CIPROFLOXACIN 250 MG/1
250 TABLET, FILM COATED ORAL 2 TIMES DAILY
Qty: 10 TABLET | Refills: 0 | Status: SHIPPED | OUTPATIENT
Start: 2022-02-16 | End: 2022-02-21

## 2022-02-16 NOTE — TELEPHONE ENCOUNTER
Pt's sugars have been doing fine, pt is not sleeping well, feeling pressure while urinating. Pended script for Cipro for 5 more days in previous message. [place] [facility] [place] of what to do. Also has call out to psychiatrist.  Please advise. Pt's caregiver informed to watch for any fever, chills, blood in urine.       Flavio Kelly [phone]

## 2022-02-16 NOTE — TELEPHONE ENCOUNTER
Spoke with patients care giver Darnell Muñoz and she stated patient is comfortable and has no fever. Blood sugars are stable. No new problems reported. Advised to call office if any medical needs arise.

## 2022-02-17 ENCOUNTER — HOSPITAL ENCOUNTER (EMERGENCY)
Age: 87
Discharge: HOME | End: 2022-02-17
Attending: STUDENT IN AN ORGANIZED HEALTH CARE EDUCATION/TRAINING PROGRAM
Payer: MEDICARE

## 2022-02-17 ENCOUNTER — TELEPHONE (OUTPATIENT)
Dept: FAMILY MEDICINE CLINIC | Age: 87
End: 2022-02-17

## 2022-02-17 ENCOUNTER — APPOINTMENT (OUTPATIENT)
Dept: GENERAL RADIOLOGY | Age: 87
End: 2022-02-17
Payer: MEDICARE

## 2022-02-17 VITALS
SYSTOLIC BLOOD PRESSURE: 138 MMHG | RESPIRATION RATE: 18 BRPM | BODY MASS INDEX: 21.79 KG/M2 | OXYGEN SATURATION: 99 % | WEIGHT: 123 LBS | TEMPERATURE: 97.8 F | DIASTOLIC BLOOD PRESSURE: 66 MMHG | HEART RATE: 78 BPM | HEIGHT: 63 IN

## 2022-02-17 DIAGNOSIS — E83.52 HYPERCALCEMIA: ICD-10-CM

## 2022-02-17 DIAGNOSIS — D72.829 LEUKOCYTOSIS, UNSPECIFIED TYPE: ICD-10-CM

## 2022-02-17 DIAGNOSIS — R35.0 URINARY FREQUENCY: Primary | ICD-10-CM

## 2022-02-17 DIAGNOSIS — R73.9 HYPERGLYCEMIA: ICD-10-CM

## 2022-02-17 LAB
ALBUMIN SERPL-MCNC: 4.6 G/DL (ref 3.5–5.2)
ALP BLD-CCNC: 46 U/L (ref 35–104)
ALT SERPL-CCNC: 20 U/L (ref 0–32)
ANION GAP SERPL CALCULATED.3IONS-SCNC: 9 MMOL/L (ref 7–16)
AST SERPL-CCNC: 32 U/L (ref 0–31)
BACTERIA: NORMAL /HPF
BASOPHILS ABSOLUTE: 0.09 E9/L (ref 0–0.2)
BASOPHILS RELATIVE PERCENT: 0.7 % (ref 0–2)
BILIRUB SERPL-MCNC: 0.6 MG/DL (ref 0–1.2)
BILIRUBIN URINE: NEGATIVE
BLOOD, URINE: NEGATIVE
BUN BLDV-MCNC: 17 MG/DL (ref 6–23)
CALCIUM SERPL-MCNC: 10.8 MG/DL (ref 8.6–10.2)
CHLORIDE BLD-SCNC: 101 MMOL/L (ref 98–107)
CHP ED QC CHECK: NORMAL
CLARITY: CLEAR
CO2: 28 MMOL/L (ref 22–29)
COLOR: YELLOW
CREAT SERPL-MCNC: 0.8 MG/DL (ref 0.5–1)
EOSINOPHILS ABSOLUTE: 0.1 E9/L (ref 0.05–0.5)
EOSINOPHILS RELATIVE PERCENT: 0.8 % (ref 0–6)
EPITHELIAL CELLS, UA: NORMAL /HPF
GFR AFRICAN AMERICAN: >60
GFR NON-AFRICAN AMERICAN: >60 ML/MIN/1.73
GLUCOSE BLD-MCNC: 190 MG/DL
GLUCOSE BLD-MCNC: 206 MG/DL (ref 74–99)
GLUCOSE URINE: NEGATIVE MG/DL
HCT VFR BLD CALC: 40.8 % (ref 34–48)
HEMOGLOBIN: 13.4 G/DL (ref 11.5–15.5)
IMMATURE GRANULOCYTES #: 0.08 E9/L
IMMATURE GRANULOCYTES %: 0.7 % (ref 0–5)
KETONES, URINE: NEGATIVE MG/DL
LEUKOCYTE ESTERASE, URINE: NEGATIVE
LYMPHOCYTES ABSOLUTE: 2.1 E9/L (ref 1.5–4)
LYMPHOCYTES RELATIVE PERCENT: 17.4 % (ref 20–42)
MCH RBC QN AUTO: 29.5 PG (ref 26–35)
MCHC RBC AUTO-ENTMCNC: 32.8 % (ref 32–34.5)
MCV RBC AUTO: 89.9 FL (ref 80–99.9)
METER GLUCOSE: 190 MG/DL (ref 74–99)
MONOCYTES ABSOLUTE: 0.86 E9/L (ref 0.1–0.95)
MONOCYTES RELATIVE PERCENT: 7.1 % (ref 2–12)
NEUTROPHILS ABSOLUTE: 8.85 E9/L (ref 1.8–7.3)
NEUTROPHILS RELATIVE PERCENT: 73.3 % (ref 43–80)
NITRITE, URINE: NEGATIVE
PDW BLD-RTO: 13.3 FL (ref 11.5–15)
PH UA: 6 (ref 5–9)
PLATELET # BLD: 235 E9/L (ref 130–450)
PMV BLD AUTO: 10.5 FL (ref 7–12)
POTASSIUM REFLEX MAGNESIUM: 4.3 MMOL/L (ref 3.5–5)
PROTEIN UA: NEGATIVE MG/DL
RBC # BLD: 4.54 E12/L (ref 3.5–5.5)
RBC UA: NORMAL /HPF (ref 0–2)
SODIUM BLD-SCNC: 138 MMOL/L (ref 132–146)
SPECIFIC GRAVITY UA: 1.01 (ref 1–1.03)
TOTAL PROTEIN: 7.6 G/DL (ref 6.4–8.3)
UROBILINOGEN, URINE: 0.2 E.U./DL
WBC # BLD: 12.1 E9/L (ref 4.5–11.5)
WBC UA: NORMAL /HPF (ref 0–5)

## 2022-02-17 PROCEDURE — 81001 URINALYSIS AUTO W/SCOPE: CPT

## 2022-02-17 PROCEDURE — 2580000003 HC RX 258: Performed by: STUDENT IN AN ORGANIZED HEALTH CARE EDUCATION/TRAINING PROGRAM

## 2022-02-17 PROCEDURE — 82962 GLUCOSE BLOOD TEST: CPT

## 2022-02-17 PROCEDURE — 80053 COMPREHEN METABOLIC PANEL: CPT

## 2022-02-17 PROCEDURE — 99284 EMERGENCY DEPT VISIT MOD MDM: CPT

## 2022-02-17 PROCEDURE — 85025 COMPLETE CBC W/AUTO DIFF WBC: CPT

## 2022-02-17 PROCEDURE — 71045 X-RAY EXAM CHEST 1 VIEW: CPT

## 2022-02-17 RX ORDER — 0.9 % SODIUM CHLORIDE 0.9 %
500 INTRAVENOUS SOLUTION INTRAVENOUS ONCE
Status: COMPLETED | OUTPATIENT
Start: 2022-02-17 | End: 2022-02-17

## 2022-02-17 RX ADMIN — SODIUM CHLORIDE 500 ML: 9 INJECTION, SOLUTION INTRAVENOUS at 12:00

## 2022-02-17 NOTE — ED PROVIDER NOTES
Department of Emergency Medicine   ED  Provider Note  Admit Date/RoomTime: 2/17/2022 10:57 AM  ED Room: [facility]          History of Present Illness:  2/17/22, Time: 11:40 AM EST  Chief Complaint   Patient presents with    Urinary Tract Infection     currently being tx with antibiotics for UTI, per family symptoms not resolving, increase frequency urination       Sonia Rayo is a 80 y.o. female presenting to the ED for possible urinary tract infection. The patient's sister is at the bedside and is the main historian. She is the power of , Luis Angel Woo. She states that for the past week to week and a half the patient has had increased urinary frequency. Originally, they were concerned for urinary tract infection and contacted the patient's primary care physician. She was started on ciprofloxacin approximately a week ago. She finished this medication and continued to have increased urinary frequency. They again contacted the primary care physician who called him in a second prescription for ciprofloxacin yesterday. The patient has had multiple urinary tract infections in the past.  She is not experiencing any dysuria or hematuria. No abdominal pain, chest pain, shortness of breath, fevers or cough. She is having bowel movements without difficulty. She lives at home and has 24-hour caregivers. Sugars have been normal.    Review of Systems:   Constitutional symptoms: Denies fever  Eyes: Denies eye pain  Ears, Nose, Mouth, Throat: Denies ear pain  Cardiovascular: Denies chest pain  Respiratory: Denies shortness of breath  Gastrointestinal: Denies blood per rectum  Genitourinary: Denies hematuria.   Positive for increased urinary frequency  Skin: Denies rashes  Neurological: Denies headache  Musculoskeletal: Denies extremity pain    --------------------------------------------- PAST HISTORY ---------------------------------------------  Past Medical History:  has a past medical history of Diabetes mellitus ([address] Hyperlipidemia, Hypertension, and Thyroid disease. Past Surgical History:  has a past surgical history that includes Thyroidectomy and Dilation and curettage of uterus. Social History:  reports that she has never smoked. She has never used smokeless tobacco. She reports that she does not drink alcohol and does not use drugs. Family History: family history is not on file. . Unless otherwise noted, family history is non contributory    The patients home medications have been reviewed. Allergies: Patient has no known allergies. I have reviewed the past medical history, past surgical history, social history, and family history    ---------------------------------------------------PHYSICAL EXAM--------------------------------------    General: The patient is conversational and lying comfortably in bed. Pleasant  Head: Normocephalic and atraumatic. Eyes: Sclera non-icteric and no conjunctival injection  ENT: Nasal and oral membranes appear minimally dry  Neck: Trachea midline. No JVD  Respiratory: Lungs clear to auscultation bilaterally. Patient speaking in full sentences. Cardiovascular: Regular rate. No pedal edema. Gastrointestinal:  Abdomen is soft and nondistended. Bowel sounds present. There is no tenderness. There is no guarding or rebound. Musculoskeletal: No deformity  Skin: Skin warm and dry. No rashes. Neurologic: No gross motor deficits. No aphasia. Alert to person, place and time  Psychiatric: Normal affect.    -------------------------------------------------- RESULTS -------------------------------------------------  I have personally reviewed all laboratory and imaging results for this patient. Results are listed below.      LABS: (Lab results interpreted by me)  Results for orders placed or performed during the hospital encounter of 02/17/22   Comprehensive Metabolic Panel w/ Reflex to MG   Result Value Ref Range    Sodium 138 132 - 146 mmol/L Potassium reflex Magnesium 4.3 3.5 - 5.0 mmol/L    Chloride 101 98 - 107 mmol/L    CO2 28 22 - 29 mmol/L    Anion Gap 9 7 - 16 mmol/L    Glucose 206 (H) 74 - 99 mg/dL    BUN 17 6 - 23 mg/dL    CREATININE 0.8 0.5 - 1.0 mg/dL    GFR Non-African American >60 >=60 mL/min/1.73    GFR African American >60     Calcium 10.8 (H) 8.6 - 10.2 mg/dL    Total Protein 7.6 6.4 - 8.3 g/dL    Albumin 4.6 3.5 - 5.2 g/dL    Total Bilirubin 0.6 0.0 - 1.2 mg/dL    Alkaline Phosphatase 46 35 - 104 U/L    ALT 20 0 - 32 U/L    AST 32 (H) 0 - 31 U/L   CBC with Auto Differential   Result Value Ref Range    WBC 12.1 (H) 4.5 - 11.5 E9/L    RBC 4.54 3.50 - 5.50 E12/L    Hemoglobin 13.4 11.5 - 15.5 g/dL    Hematocrit 40.8 34.0 - 48.0 %    MCV 89.9 80.0 - 99.9 fL    MCH 29.5 26.0 - 35.0 pg    MCHC 32.8 32.0 - 34.5 %    RDW 13.3 11.5 - 15.0 fL    Platelets 216 114 - 717 E9/L    MPV 10.5 7.0 - 12.0 fL    Neutrophils % 73.3 43.0 - 80.0 %    Immature Granulocytes % 0.7 0.0 - 5.0 %    Lymphocytes % 17.4 (L) 20.0 - 42.0 %    Monocytes % 7.1 2.0 - 12.0 %    Eosinophils % 0.8 0.0 - 6.0 %    Basophils % 0.7 0.0 - 2.0 %    Neutrophils Absolute 8.85 (H) 1.80 - 7.30 E9/L    Immature Granulocytes # 0.08 E9/L    Lymphocytes Absolute 2.10 1.50 - 4.00 E9/L    Monocytes Absolute 0.86 0.10 - 0.95 E9/L    Eosinophils Absolute 0.10 0.05 - 0.50 E9/L    Basophils Absolute 0.09 0.00 - 0.20 E9/L   Urinalysis with Microscopic   Result Value Ref Range    Color, UA Yellow Straw/Yellow    Clarity, UA Clear Clear    Glucose, Ur Negative Negative mg/dL    Bilirubin Urine Negative Negative    Ketones, Urine Negative Negative mg/dL    Specific Gravity, UA 1.010 1.005 - 1.030    Blood, Urine Negative Negative    pH, UA 6.0 5.0 - 9.0    Protein, UA Negative Negative mg/dL    Urobilinogen, Urine 0.2 <2.0 E.U./dL    Nitrite, Urine Negative Negative    Leukocyte Esterase, Urine Negative Negative    WBC, UA NONE 0 - 5 /HPF    RBC, UA NONE 0 - 2 /HPF    Epithelial Cells, UA RARE /HPF Bacteria, UA NONE SEEN None Seen /HPF   POCT Glucose   Result Value Ref Range    Glucose 190 mg/dL    QC OK? y    POCT Glucose   Result Value Ref Range    Meter Glucose 190 (H) 74 - 99 mg/dL   ,     RADIOLOGY:  Interpreted by Radiologist unless otherwise specified  XR CHEST PORTABLE   Final Result   1. There is no acute cardiopulmonary disease.             ------------------------- NURSING NOTES AND VITALS REVIEWED ---------------------------   The nursing notes within the ED encounter and vital signs as below have been reviewed by myself  /66   Pulse 78   Temp 97.8 °F (36.6 °C)   Resp 18   Ht 5' 3\" (1.6 m)   Wt 123 lb (55.8 kg)   LMP 08/01/2015   SpO2 99%   BMI 21.79 kg/m²     Oxygen Saturation Interpretation: Normal    The patients available past medical records and past encounters were reviewed. ------------------------------ ED COURSE/MEDICAL DECISION MAKING----------------------  Medications   0.9 % sodium chloride bolus (0 mLs IntraVENous Stopped 2/17/22 1403)       Medical Decision Making: This is a 80 y.o. female presenting to the ED for possible urinary tract infection. On initial presentation, the patient's vital signs are interpreted as hypertensive, afebrile and saturating well on room air. Based on history and physical exam, we have a broad differential.  We considered urinary tract infection as well as hyperglycemia. Patient does have known history of diabetes mellitus. Abdominal exam is soft and nonsurgical.  No systemic evidence of infection. At this time we will obtain basic laboratory studies and urinalysis. Point-of-care glucose obtained and found to be 190. As the patient has no complaints, she will not be given anything for pain at this time. She will be hydrated with 500 cc bolus as she does have dry mucous membranes. Laboratory studies do show hyperglycemia at 206. No anion gap or acidosis however. Mild elevated calcium.   This may be secondary to dehydration however the patient is being hydrated. Electrolytes otherwise within normal limits. Minimal elevation of AST at 32. LFTs otherwise within normal limits. Patient does have leukocytosis of 12.1. No anemia. With her leukocytosis we will add a chest x-ray to ensure there is no pneumonia although the patient is asymptomatic. Urinalysis shows no evidence of urinary tract infection. Is minimally contaminated sample. Chest x-ray shows no acute cardiopulmonary process. This is interpreted by radiology. On reevaluation, patient is resting comfortably and has no complaints. She tolerates p.o. challenge. I discussed the findings with the patient and her sister. Patient verbalized a desire to be discharged home. Her sister endorses 24-hour care at home. Strict return precautions given. Patient to follow-up with a primary care physician. Sister verbalized understanding and is agreeable to the plan. This patient's ED course included: a personal history and physicial examination and re-evaluation prior to disposition    This patient has remained hemodynamically stable during their ED course. Consultations:  None    Oxygen Saturation Interpretation: 99 % on room air. Normal    Counseling:   I have spoken with the patient and family member sister and discussed todays results, in addition to providing specific details for the plan of care and counseling regarding the diagnosis and prognosis. Questions are answered at this time and they are agreeable with the plan.     --------------------------------- IMPRESSION AND DISPOSITION ---------------------------------    IMPRESSION  1. Urinary frequency    2. Hyperglycemia    3. Hypercalcemia    4. Leukocytosis, unspecified type        DISPOSITION  Disposition: Discharge to home  Patient condition is fair    IDr. Cat, am the primary provider of record    NOTE: This report was transcribed using voice recognition software.  Every effort was made to ensure accuracy; however, inadvertent computerized transcription errors may be present        Danielle Fregoso MD  02/17/22 2563

## 2022-02-17 NOTE — ED NOTES
pts sister has elected to take pt back home herself in her own vehicle. Sister sts she \"has several people that can help me get her in the house. \"     Noa Chand RN  02/17/22 8750

## 2022-02-17 NOTE — TELEPHONE ENCOUNTER
Per Dr. Lindsey Thomas instructed pt to take pt to ER to be evaluated for possible sepsis or to check to see if possibly urinary retention

## 2022-02-17 NOTE — ED NOTES
[facility] called, ETA 2-3 hrs.  ETA [phone]     Dennie Kenning, ELZBIETA  02/17/22 7529

## 2022-02-17 NOTE — ED NOTES
[facility] called, ride canceled. Pt home with sister, assisted to car by this nurse.       Wyatt Iqbal RN  02/17/22 6630

## 2022-02-23 ENCOUNTER — TELEPHONE (OUTPATIENT)
Dept: FAMILY MEDICINE CLINIC | Age: 87
End: 2022-02-23

## 2022-02-23 NOTE — TELEPHONE ENCOUNTER
Purnima's sister Brian Hernandez called today to state that in 9 hours purnima went to the bathroom 16 times, she only urinated 9 times. She said it has been recommended that the elderly take something for frequency, Gonzalez Blood is not on anything.  Please call Verónica Asif and advise she doesn't know what to do [phone]

## 2022-02-23 NOTE — TELEPHONE ENCOUNTER
----- Message from Shanicelaide Runner sent at 2/22/2022  5:06 PM EST -----  Subject: Message to Provider    QUESTIONS  Information for Provider? Sister Ivan Ott has called in about patient   frequent urination and she requesting a call back to discuss if there are   medications to help ease her sisters comfort.   ---------------------------------------------------------------------------  --------------  CALL BACK INFO  What is the best way for the office to contact you? OK to leave message on   voicemail  Preferred Call Back Phone Number? [phone]  ---------------------------------------------------------------------------  --------------  SCRIPT ANSWERS  Relationship to Patient?  Self

## 2022-02-23 NOTE — TELEPHONE ENCOUNTER
SPOKE WITH HER SISTER AND SHE STATES THAT SHE HAS BEEN URINATING MORE FREQUENTLY AND SHE WENT  16 TIMES YESTERDAY AFTER EATING BREAKFAST. Was seen in er on 2/17/22 for urinary freq  and was told that she was fine and no ABX needed. Her sugars have been ranging 140's to 150's and the highest that it has been is 169. Denies all UTI symptoms     Please advise and thank you.

## 2022-02-24 NOTE — TELEPHONE ENCOUNTER
Spoke with patients sister regarding patients complain of Urinary frequency  Spoke with [facility] pharmacist  A script for Trospium 20 mgm po daily has already been called by Urologist Dr Kin Engel and is waiting to be picked up at the pharmacy. We do not need to escribe [facility] to avoid duplication. Please have the sister call us in a week with update.   Family can  the medication sent by urologist at the pharmacy

## 2022-03-10 ENCOUNTER — TELEPHONE (OUTPATIENT)
Dept: FAMILY MEDICINE CLINIC | Age: 87
End: 2022-03-10

## 2022-03-10 NOTE — TELEPHONE ENCOUNTER
[facility],    Ramon Lopez / patient had a urine scheck with urine stick and it immediately turned purple for infection. She finished the 7  day bladder pill prescribed by urology for the frequency    Kalyan Lopez states that she does have Cipro at home, if you would be ok with her taking that??? Would you like for her to come in and see you for an appointment or can they bring in a specimen to be checked?     Thank you

## 2022-03-10 NOTE — TELEPHONE ENCOUNTER
Family Will call Dr Annabelle Nelson (urologist) to see if they can get her in to see him / will let us know outcome.

## 2022-03-11 ENCOUNTER — HOSPITAL ENCOUNTER (EMERGENCY)
Age: 87
Discharge: HOME | End: 2022-03-11
Attending: EMERGENCY MEDICINE
Payer: MEDICARE

## 2022-03-11 ENCOUNTER — APPOINTMENT (OUTPATIENT)
Dept: CT IMAGING | Age: 87
End: 2022-03-11
Payer: MEDICARE

## 2022-03-11 VITALS
WEIGHT: 123 LBS | SYSTOLIC BLOOD PRESSURE: 145 MMHG | RESPIRATION RATE: 20 BRPM | OXYGEN SATURATION: 92 % | HEART RATE: 108 BPM | BODY MASS INDEX: 21.79 KG/M2 | TEMPERATURE: 98 F | DIASTOLIC BLOOD PRESSURE: 68 MMHG

## 2022-03-11 DIAGNOSIS — R33.9 URINARY RETENTION: Primary | ICD-10-CM

## 2022-03-11 LAB
ALBUMIN SERPL-MCNC: 4 G/DL (ref 3.5–5.2)
ALP BLD-CCNC: 51 U/L (ref 35–104)
ALT SERPL-CCNC: 16 U/L (ref 0–32)
ANION GAP SERPL CALCULATED.3IONS-SCNC: 16 MMOL/L (ref 7–16)
AST SERPL-CCNC: 16 U/L (ref 0–31)
BACTERIA: ABNORMAL /HPF
BASOPHILS ABSOLUTE: 0.06 E9/L (ref 0–0.2)
BASOPHILS RELATIVE PERCENT: 0.3 % (ref 0–2)
BILIRUB SERPL-MCNC: 0.5 MG/DL (ref 0–1.2)
BILIRUBIN URINE: NEGATIVE
BLOOD, URINE: NEGATIVE
BUN BLDV-MCNC: 16 MG/DL (ref 6–23)
BURR CELLS: ABNORMAL
CALCIUM SERPL-MCNC: 10.8 MG/DL (ref 8.6–10.2)
CHLORIDE BLD-SCNC: 95 MMOL/L (ref 98–107)
CLARITY: CLEAR
CO2: 22 MMOL/L (ref 22–29)
COLOR: YELLOW
CREAT SERPL-MCNC: 0.8 MG/DL (ref 0.5–1)
EKG ATRIAL RATE: 103 BPM
EKG P AXIS: 43 DEGREES
EKG P-R INTERVAL: 178 MS
EKG Q-T INTERVAL: 338 MS
EKG QRS DURATION: 86 MS
EKG QTC CALCULATION (BAZETT): 442 MS
EKG R AXIS: -30 DEGREES
EKG T AXIS: 32 DEGREES
EKG VENTRICULAR RATE: 103 BPM
EOSINOPHILS ABSOLUTE: 0.04 E9/L (ref 0.05–0.5)
EOSINOPHILS RELATIVE PERCENT: 0.2 % (ref 0–6)
GFR AFRICAN AMERICAN: >60
GFR NON-AFRICAN AMERICAN: >60 ML/MIN/1.73
GLUCOSE BLD-MCNC: 263 MG/DL (ref 74–99)
GLUCOSE URINE: NEGATIVE MG/DL
HCT VFR BLD CALC: 38.1 % (ref 34–48)
HEMOGLOBIN: 12.5 G/DL (ref 11.5–15.5)
IMMATURE GRANULOCYTES #: 0.15 E9/L
IMMATURE GRANULOCYTES %: 0.7 % (ref 0–5)
KETONES, URINE: NEGATIVE MG/DL
LACTIC ACID: 1.9 MMOL/L (ref 0.5–2.2)
LACTIC ACID: 3.8 MMOL/L (ref 0.5–2.2)
LEUKOCYTE ESTERASE, URINE: ABNORMAL
LIPASE: 15 U/L (ref 13–60)
LYMPHOCYTES ABSOLUTE: 1.13 E9/L (ref 1.5–4)
LYMPHOCYTES RELATIVE PERCENT: 5.1 % (ref 20–42)
MCH RBC QN AUTO: 29.3 PG (ref 26–35)
MCHC RBC AUTO-ENTMCNC: 32.8 % (ref 32–34.5)
MCV RBC AUTO: 89.2 FL (ref 80–99.9)
MONOCYTES ABSOLUTE: 1.52 E9/L (ref 0.1–0.95)
MONOCYTES RELATIVE PERCENT: 6.9 % (ref 2–12)
NEUTROPHILS ABSOLUTE: 19.06 E9/L (ref 1.8–7.3)
NEUTROPHILS RELATIVE PERCENT: 86.8 % (ref 43–80)
NITRITE, URINE: NEGATIVE
OVALOCYTES: ABNORMAL
PDW BLD-RTO: 13.4 FL (ref 11.5–15)
PH UA: 6 (ref 5–9)
PLATELET # BLD: 269 E9/L (ref 130–450)
PMV BLD AUTO: 10.8 FL (ref 7–12)
POIKILOCYTES: ABNORMAL
POLYCHROMASIA: ABNORMAL
POTASSIUM SERPL-SCNC: 4.9 MMOL/L (ref 3.5–5)
PROTEIN UA: NEGATIVE MG/DL
RBC # BLD: 4.27 E12/L (ref 3.5–5.5)
RBC UA: ABNORMAL /HPF (ref 0–2)
SCHISTOCYTES: ABNORMAL
SODIUM BLD-SCNC: 133 MMOL/L (ref 132–146)
SPECIFIC GRAVITY UA: 1.01 (ref 1–1.03)
TOTAL PROTEIN: 7.5 G/DL (ref 6.4–8.3)
TROPONIN, HIGH SENSITIVITY: 14 NG/L (ref 0–9)
UROBILINOGEN, URINE: 0.2 E.U./DL
WBC # BLD: 22 E9/L (ref 4.5–11.5)
WBC UA: ABNORMAL /HPF (ref 0–5)

## 2022-03-11 PROCEDURE — 81001 URINALYSIS AUTO W/SCOPE: CPT

## 2022-03-11 PROCEDURE — 74177 CT ABD & PELVIS W/CONTRAST: CPT

## 2022-03-11 PROCEDURE — 6360000004 HC RX CONTRAST MEDICATION: Performed by: RADIOLOGY

## 2022-03-11 PROCEDURE — 6370000000 HC RX 637 (ALT 250 FOR IP): Performed by: EMERGENCY MEDICINE

## 2022-03-11 PROCEDURE — 99284 EMERGENCY DEPT VISIT MOD MDM: CPT

## 2022-03-11 PROCEDURE — 6360000002 HC RX W HCPCS: Performed by: EMERGENCY MEDICINE

## 2022-03-11 PROCEDURE — 96374 THER/PROPH/DIAG INJ IV PUSH: CPT

## 2022-03-11 PROCEDURE — 2580000003 HC RX 258: Performed by: EMERGENCY MEDICINE

## 2022-03-11 PROCEDURE — 93005 ELECTROCARDIOGRAM TRACING: CPT | Performed by: EMERGENCY MEDICINE

## 2022-03-11 PROCEDURE — 84484 ASSAY OF TROPONIN QUANT: CPT

## 2022-03-11 PROCEDURE — 83605 ASSAY OF LACTIC ACID: CPT

## 2022-03-11 PROCEDURE — 85025 COMPLETE CBC W/AUTO DIFF WBC: CPT

## 2022-03-11 PROCEDURE — 80053 COMPREHEN METABOLIC PANEL: CPT

## 2022-03-11 PROCEDURE — 83690 ASSAY OF LIPASE: CPT

## 2022-03-11 RX ORDER — CEFDINIR 300 MG/1
300 CAPSULE ORAL 2 TIMES DAILY
Qty: 20 CAPSULE | Refills: 0 | Status: SHIPPED | OUTPATIENT
Start: 2022-03-11 | End: 2022-03-21

## 2022-03-11 RX ORDER — CEFDINIR 300 MG/1
300 CAPSULE ORAL ONCE
Status: COMPLETED | OUTPATIENT
Start: 2022-03-11 | End: 2022-03-11

## 2022-03-11 RX ORDER — FENTANYL CITRATE 50 UG/ML
50 INJECTION, SOLUTION INTRAMUSCULAR; INTRAVENOUS ONCE
Status: COMPLETED | OUTPATIENT
Start: 2022-03-11 | End: 2022-03-11

## 2022-03-11 RX ORDER — 0.9 % SODIUM CHLORIDE 0.9 %
1000 INTRAVENOUS SOLUTION INTRAVENOUS ONCE
Status: COMPLETED | OUTPATIENT
Start: 2022-03-11 | End: 2022-03-11

## 2022-03-11 RX ORDER — SODIUM CHLORIDE 9 MG/ML
INJECTION, SOLUTION INTRAVENOUS CONTINUOUS
Status: DISCONTINUED | OUTPATIENT
Start: 2022-03-11 | End: 2022-03-11 | Stop reason: HOSPADM

## 2022-03-11 RX ADMIN — SODIUM CHLORIDE 1000 ML: 9 INJECTION, SOLUTION INTRAVENOUS at 11:51

## 2022-03-11 RX ADMIN — FENTANYL CITRATE 50 MCG: 50 INJECTION, SOLUTION INTRAMUSCULAR; INTRAVENOUS at 12:12

## 2022-03-11 RX ADMIN — CEFDINIR 300 MG: 300 CAPSULE ORAL at 17:56

## 2022-03-11 RX ADMIN — IOPAMIDOL 90 ML: 755 INJECTION, SOLUTION INTRAVENOUS at 15:17

## 2022-03-11 RX ADMIN — SODIUM CHLORIDE: 9 INJECTION, SOLUTION INTRAVENOUS at 13:18

## 2022-03-11 ASSESSMENT — PAIN DESCRIPTION - LOCATION: LOCATION: ABDOMEN;FLANK

## 2022-03-11 ASSESSMENT — PAIN SCALES - GENERAL
PAINLEVEL_OUTOF10: 8
PAINLEVEL_OUTOF10: 7

## 2022-03-11 ASSESSMENT — PAIN DESCRIPTION - PAIN TYPE: TYPE: ACUTE PAIN

## 2022-03-11 ASSESSMENT — PAIN DESCRIPTION - ORIENTATION: ORIENTATION: RIGHT

## 2022-03-11 NOTE — ED PROVIDER NOTES
Department of Emergency Medicine   ED  Provider Note  Admit Date/RoomTime: 3/11/2022 10:50 AM  ED Room: [facility]          History of Present Illness:  3/11/22, Time: 5:45 PM EST  Chief Complaint   Patient presents with    Flank Pain     per ems patient sister reports right flank pain and possible UTI, constipation, no BM since Monday, concerned for bowel obstruction    Constipation    Urinary Tract Infection                Davis Tran is a 80 y.o. female presenting to the ED for flank pain. Patient states that she had flank pain for the past day and a half. Came on gradually, nothing makes it better or worse, does radiate to her groin. She is also having trouble moving her bowels. Her sister is concerned she may have a UTI. She states that she has been continent of urine intermittently. Patient is usually distended, but she seems more distended than usual. She denies nausea, vomiting, cough, sputum, chest pain, shortness of breath, back pain, lethargy, or any other symptoms or complaints. Review of Systems:   Pertinent positives and negatives are stated within HPI, all other systems reviewed and are negative.        --------------------------------------------- PAST HISTORY ---------------------------------------------  Past Medical History:  has a past medical history of Diabetes mellitus ([address] Hyperlipidemia, Hypertension, and Thyroid disease. Past Surgical History:  has a past surgical history that includes Thyroidectomy and Dilation and curettage of uterus. Social History:  reports that she has never smoked. She has never used smokeless tobacco. She reports that she does not drink alcohol and does not use drugs. Family History: family history is not on file. . Unless otherwise noted, family history is non contributory    The patients home medications have been reviewed. Allergies: Patient has no known allergies.         ---------------------------------------------------PHYSICAL EXAM--------------------------------------    Constitutional/General: Alert and oriented x3  Head: Normocephalic and atraumatic  Eyes: PERRL, EOMI, sclera non icteric  Mouth: Oropharynx clear, handling secretions, no trismus, no asymmetry of the posterior oropharynx or uvular edema  Neck: Supple, full ROM, no stridor, no meningeal signs  Respiratory: Lungs clear to auscultation bilaterally, no wheezes, rales, or rhonchi. Not in respiratory distress  Cardiovascular:  Regular tachycardia. Regular rhythm. 2+ distal pulses. Equal extremity pulses. Chest: No chest wall tenderness  GI:  Abdomen distended, no guarding or rebound, nontender  Musculoskeletal: Moves all extremities x 4. Warm and well perfused, no clubbing, cyanosis, or edema. Capillary refill <3 seconds  Integument: skin warm and dry. No rashes. Neurologic: GCS 15, no focal deficits, symmetric strength 5/5 in the upper and lower extremities bilaterally  Psychiatric: Normal Affect          -------------------------------------------------- RESULTS -------------------------------------------------  I have personally reviewed all laboratory and imaging results for this patient. Results are listed below.      LABS: (Lab results interpreted by me)  Results for orders placed or performed during the hospital encounter of 03/11/22   CBC with Auto Differential   Result Value Ref Range    WBC 22.0 (H) 4.5 - 11.5 E9/L    RBC 4.27 3.50 - 5.50 E12/L    Hemoglobin 12.5 11.5 - 15.5 g/dL    Hematocrit 38.1 34.0 - 48.0 %    MCV 89.2 80.0 - 99.9 fL    MCH 29.3 26.0 - 35.0 pg    MCHC 32.8 32.0 - 34.5 %    RDW 13.4 11.5 - 15.0 fL    Platelets 376 575 - 282 E9/L    MPV 10.8 7.0 - 12.0 fL    Neutrophils % 86.8 (H) 43.0 - 80.0 %    Immature Granulocytes % 0.7 0.0 - 5.0 %    Lymphocytes % 5.1 (L) 20.0 - 42.0 %    Monocytes % 6.9 2.0 - 12.0 %    Eosinophils % 0.2 0.0 - 6.0 %    Basophils % 0.3 0.0 - 2.0 %    Neutrophils Absolute 19.06 (H) 1.80 - 7.30 E9/L    Immature Granulocytes # 0.15 E9/L    Lymphocytes Absolute 1.13 (L) 1.50 - 4.00 E9/L    Monocytes Absolute 1.52 (H) 0.10 - 0.95 E9/L    Eosinophils Absolute 0.04 (L) 0.05 - 0.50 E9/L    Basophils Absolute 0.06 0.00 - 0.20 E9/L    Polychromasia 1+     Poikilocytes 1+     Schistocytes 1+     [place] Cells 1+     Ovalocytes 1+    Comprehensive Metabolic Panel   Result Value Ref Range    Sodium 133 132 - 146 mmol/L    Potassium 4.9 3.5 - 5.0 mmol/L    Chloride 95 (L) 98 - 107 mmol/L    CO2 22 22 - 29 mmol/L    Anion Gap 16 7 - 16 mmol/L    Glucose 263 (H) 74 - 99 mg/dL    BUN 16 6 - 23 mg/dL    CREATININE 0.8 0.5 - 1.0 mg/dL    GFR Non-African American >60 >=60 mL/min/1.73    GFR African American >60     Calcium 10.8 (H) 8.6 - 10.2 mg/dL    Total Protein 7.5 6.4 - 8.3 g/dL    Albumin 4.0 3.5 - 5.2 g/dL    Total Bilirubin 0.5 0.0 - 1.2 mg/dL    Alkaline Phosphatase 51 35 - 104 U/L    ALT 16 0 - 32 U/L    AST 16 0 - 31 U/L   Lipase   Result Value Ref Range    Lipase 15 13 - 60 U/L   Lactic Acid   Result Value Ref Range    Lactic Acid 3.8 (HH) 0.5 - 2.2 mmol/L   Troponin   Result Value Ref Range    Troponin, High Sensitivity 14 (H) 0 - 9 ng/L   Urinalysis   Result Value Ref Range    Color, UA Yellow Straw/Yellow    Clarity, UA Clear Clear    Glucose, Ur Negative Negative mg/dL    Bilirubin Urine Negative Negative    Ketones, Urine Negative Negative mg/dL    Specific Gravity, UA 1.010 1.005 - 1.030    Blood, Urine Negative Negative    pH, UA 6.0 5.0 - 9.0    Protein, UA Negative Negative mg/dL    Urobilinogen, Urine 0.2 <2.0 E.U./dL    Nitrite, Urine Negative Negative    Leukocyte Esterase, Urine TRACE (A) Negative   Microscopic Urinalysis   Result Value Ref Range    WBC, UA 1-3 0 - 5 /HPF    RBC, UA NONE 0 - 2 /HPF    Bacteria, UA MODERATE (A) None Seen /HPF   Lactic Acid   Result Value Ref Range    Lactic Acid 1.9 0.5 - 2.2 mmol/L   EKG 12 Lead   Result Value Ref Range    Ventricular Rate 103 BPM    Atrial Rate 103 BPM    P-R Interval 178 ms QRS Duration 86 ms    Q-T Interval 338 ms    QTc Calculation (Bazett) 442 ms    P Axis 43 degrees    R Axis -30 degrees    T Axis 32 degrees   ,       RADIOLOGY:  Interpreted by Radiologist unless otherwise specified  CT ABDOMEN PELVIS W IV CONTRAST Additional Contrast? None   Final Result   Marked urinary bladder distention. Posey catheter decompression is   recommended the patient is unable to void. Probable rectal fecal impaction along with fecal retention within the   ascending and transverse colon. Diverticulosis but no evidence of diverticulitis. Stable chronic T12 and L1 compression fractures. Bibasilar atelectasis and/or pneumonitis along with mild bilateral lower lobe   bronchiectasis. Hepatic steatosis. Stable probable benign cavernous hemangioma within the spleen. Probable 5 mm benign adrenal adenoma left adrenal gland. Gallbladder hydrops but no definite CT evidence of acute cholecystitis. Bilateral hydronephrosis and ureterectasis likely due to the significant   urinary bladder distension. No obstructing lesions are identified. Hiatal hernia. EKG Interpretation  Interpreted by emergency department physician, Dr. Colleen Mejia, rate 103, no STEMI, no ischemic change         ------------------------- NURSING NOTES AND VITALS REVIEWED ---------------------------   The nursing notes within the ED encounter and vital signs as below have been reviewed by myself  BP (!) 169/68   Pulse 109   Temp 98 °F (36.7 °C)   Resp 19   Wt 123 lb (55.8 kg)   LMP 08/01/2015   SpO2 (!) 75%   BMI 21.79 kg/m²     Oxygen Saturation Interpretation: Normal    The patients available past medical records and past encounters were reviewed.         ------------------------------ ED COURSE/MEDICAL DECISION MAKING----------------------  Medications   0.9 % sodium chloride infusion ( IntraVENous New Bag 3/11/22 1318)   cefdinir (OMNICEF) capsule 300 mg (has no administration in time range)   0.9 % sodium chloride bolus (0 mLs IntraVENous Stopped 3/11/22 1318)   fentaNYL (SUBLIMAZE) injection 50 mcg (50 mcg IntraVENous Given 3/11/22 1212)   iopamidol (ISOVUE-370) 76 % injection 90 mL (90 mLs IntraVENous Given 3/11/22 1517)           The cardiac monitor revealed sinus with a heart rate in the 90s as interpreted by me. The cardiac monitor was ordered secondary to the patient's ab pain and to monitor the patient for dysrhythmia. CPT [id number]         Medical Decision Making:    Patient received IV fluids. Labs and imaging reviewed. 40 catheter was placed, about 2000 cc of urine was removed. Patient did have much improvement of her symptoms with this. On reevaluation, she is resting comfortably. Once again, repeat abdominal examination at this time does not indicate a surgical abdomen. She feels better, she is requesting to go home. Discussed the findings with her and her sister, they state they will follow-up. Therefore, patient was started on Omnicef given her leukocytosis. She is to follow-up with urology, Posey catheter will remain in place. They are also to follow-up with her PCP, they both with his understanding. They were both educated on signs and symptoms require emergent evaluation. Counseling: The emergency provider has spoken with the patient and discussed todays results, in addition to providing specific details for the plan of care and counseling regarding the diagnosis and prognosis. Questions are answered at this time and they are agreeable with the plan.       --------------------------------- IMPRESSION AND DISPOSITION ---------------------------------    IMPRESSION  1. Urinary retention        DISPOSITION  Disposition: Discharge to home  Patient condition is stable        NOTE: This report was transcribed using voice recognition software.  Every effort was made to ensure accuracy; however, inadvertent computerized transcription errors may be present        Lida Guillen MD  03/11/22 2433

## 2022-04-20 RX ORDER — AMLODIPINE BESYLATE 10 MG/1
10 TABLET ORAL DAILY
Qty: 30 TABLET | Refills: 3 | Status: SHIPPED
Start: 2022-04-20 | End: 2022-07-18

## 2022-05-04 ENCOUNTER — TELEPHONE (OUTPATIENT)
Dept: FAMILY MEDICINE CLINIC | Age: 87
End: 2022-05-04

## 2022-05-04 NOTE — TELEPHONE ENCOUNTER
Kindly give her a phone call regarding type of Home assistance she needs. Does she need a home Health nurse referral?  Kindly place a referral for home health nurse for evaluation,Cardio pulmonary assesment,Med compliance if she is in agreement.

## 2022-05-04 NOTE — TELEPHONE ENCOUNTER
----- Message from Frankie Bean sent at 5/4/2022 10:12 AM EDT -----  Subject: Message to Provider    QUESTIONS  Information for Provider? Patient is needing home assistance, set   appointment for the 20th to discuss options, but would like a call back   earlier if possible.  ---------------------------------------------------------------------------  --------------  CALL BACK INFO  What is the best way for the office to contact you? OK to leave message on   voicemail  Preferred Call Back Phone Number? [phone]  ---------------------------------------------------------------------------  --------------  SCRIPT ANSWERS  Relationship to Patient? Sibling  Representative Name? Hakan Booker  Is the Representative on the appropriate HIPAA document in Epic?  Yes

## 2022-05-05 ENCOUNTER — TELEPHONE (OUTPATIENT)
Dept: FAMILY MEDICINE CLINIC | Age: 87
End: 2022-05-05

## 2022-05-05 DIAGNOSIS — I10 HYPERTENSION, UNSPECIFIED TYPE: ICD-10-CM

## 2022-05-05 DIAGNOSIS — E03.9 HYPOTHYROIDISM, UNSPECIFIED TYPE: ICD-10-CM

## 2022-05-05 DIAGNOSIS — E11.9 CONTROLLED TYPE 2 DIABETES MELLITUS WITHOUT COMPLICATION, WITHOUT LONG-TERM CURRENT USE OF INSULIN (HCC): ICD-10-CM

## 2022-05-05 DIAGNOSIS — J96.01 ACUTE RESPIRATORY FAILURE WITH HYPOXIA (HCC): ICD-10-CM

## 2022-05-05 DIAGNOSIS — F41.9 ANXIETY: ICD-10-CM

## 2022-05-05 NOTE — TELEPHONE ENCOUNTER
Lino Williamson is calling about [employer] concerning her medication Trulance. Pt has been taking Trulance since April 28th every other day, she is also taking Fiber Con daily,  Her bowel movements are loose occasionally formed so Lino Williamson is concerned with how long pt should be taking the Trulance. Her problem is bowel problems not emptying completely stating nights are very bad.   Please advise

## 2022-05-06 ENCOUNTER — TELEPHONE (OUTPATIENT)
Dept: FAMILY MEDICINE CLINIC | Age: 87
End: 2022-05-06

## 2022-05-06 DIAGNOSIS — J96.01 ACUTE RESPIRATORY FAILURE WITH HYPOXIA (HCC): ICD-10-CM

## 2022-05-06 DIAGNOSIS — I10 HYPERTENSION, UNSPECIFIED TYPE: ICD-10-CM

## 2022-05-06 DIAGNOSIS — F41.9 ANXIETY: ICD-10-CM

## 2022-05-06 DIAGNOSIS — R42 DIZZINESS OF UNKNOWN CAUSE: ICD-10-CM

## 2022-05-06 DIAGNOSIS — E03.9 HYPOTHYROIDISM, UNSPECIFIED TYPE: ICD-10-CM

## 2022-05-06 DIAGNOSIS — E11.9 CONTROLLED TYPE 2 DIABETES MELLITUS WITHOUT COMPLICATION, WITHOUT LONG-TERM CURRENT USE OF INSULIN (HCC): ICD-10-CM

## 2022-05-06 NOTE — TELEPHONE ENCOUNTER
[facility] called stating that they are unable to see the patient because [facility]

## 2022-05-06 NOTE — TELEPHONE ENCOUNTER
Spoke with Ro Soares and she will call pt's insurance to find out which home health agency they are contracted with

## 2022-05-09 ENCOUNTER — TELEPHONE (OUTPATIENT)
Dept: FAMILY MEDICINE CLINIC | Age: 87
End: 2022-05-09

## 2022-05-12 NOTE — TELEPHONE ENCOUNTER
Previous PCP Dr Xu Denney prescribed Trulance. Pt's caregiver has stopped Trulance and stool is more loose to watery. Please advise.

## 2022-05-16 RX ORDER — ATORVASTATIN CALCIUM 20 MG/1
TABLET, FILM COATED ORAL
Qty: 30 TABLET | Refills: 3 | Status: SHIPPED
Start: 2022-05-16 | End: 2022-09-21

## 2022-05-16 NOTE — TELEPHONE ENCOUNTER
Last Appointment:  9/13/2021  Future Appointments   Date Time Provider Izabela Vance   5/20/2022  1:40 PM Elia Cyr  [address]

## 2022-05-16 NOTE — TELEPHONE ENCOUNTER
Called at patients house to check how she is doing. Spoke with care giver Jenn Ann. Pt has no fever. Her appetite is good. Diarrhea has improved. She is breathing comfortably and does not use oxygen at home. Jenn Ann notified that patient has appointment in our office on this coming Friday.

## 2022-05-20 ENCOUNTER — OFFICE VISIT (OUTPATIENT)
Dept: FAMILY MEDICINE CLINIC | Age: 87
End: 2022-05-20
Payer: MEDICARE

## 2022-05-20 VITALS
SYSTOLIC BLOOD PRESSURE: 132 MMHG | HEART RATE: 76 BPM | OXYGEN SATURATION: 96 % | RESPIRATION RATE: 16 BRPM | TEMPERATURE: 97.8 F | DIASTOLIC BLOOD PRESSURE: 68 MMHG | HEIGHT: 63 IN | BODY MASS INDEX: 21.79 KG/M2

## 2022-05-20 DIAGNOSIS — E03.9 HYPOTHYROIDISM, UNSPECIFIED TYPE: ICD-10-CM

## 2022-05-20 DIAGNOSIS — I10 HYPERTENSION, UNSPECIFIED TYPE: ICD-10-CM

## 2022-05-20 DIAGNOSIS — E11.9 TYPE 2 DIABETES MELLITUS WITHOUT COMPLICATION, WITHOUT LONG-TERM CURRENT USE OF INSULIN (HCC): Primary | ICD-10-CM

## 2022-05-20 DIAGNOSIS — R42 DIZZINESS OF UNKNOWN CAUSE: ICD-10-CM

## 2022-05-20 DIAGNOSIS — E55.9 VITAMIN D DEFICIENCY: ICD-10-CM

## 2022-05-20 DIAGNOSIS — F41.9 ANXIETY: ICD-10-CM

## 2022-05-20 LAB — HBA1C MFR BLD: 6.6 %

## 2022-05-20 PROCEDURE — 3044F HG A1C LEVEL LT 7.0%: CPT | Performed by: INTERNAL MEDICINE

## 2022-05-20 PROCEDURE — 83036 HEMOGLOBIN GLYCOSYLATED A1C: CPT | Performed by: INTERNAL MEDICINE

## 2022-05-20 PROCEDURE — 99214 OFFICE O/P EST MOD 30 MIN: CPT | Performed by: INTERNAL MEDICINE

## 2022-05-20 SDOH — ECONOMIC STABILITY: FOOD INSECURITY: WITHIN THE PAST 12 MONTHS, YOU WORRIED THAT YOUR FOOD WOULD RUN OUT BEFORE YOU GOT MONEY TO BUY MORE.: NEVER TRUE

## 2022-05-20 SDOH — ECONOMIC STABILITY: FOOD INSECURITY: WITHIN THE PAST 12 MONTHS, THE FOOD YOU BOUGHT JUST DIDN'T LAST AND YOU DIDN'T HAVE MONEY TO GET MORE.: NEVER TRUE

## 2022-05-20 ASSESSMENT — PATIENT HEALTH QUESTIONNAIRE - PHQ9
1. LITTLE INTEREST OR PLEASURE IN DOING THINGS: 1
SUM OF ALL RESPONSES TO PHQ9 QUESTIONS 1 & 2: 2
SUM OF ALL RESPONSES TO PHQ QUESTIONS 1-9: 2
2. FEELING DOWN, DEPRESSED OR HOPELESS: 1
SUM OF ALL RESPONSES TO PHQ QUESTIONS 1-9: 2

## 2022-05-20 ASSESSMENT — SOCIAL DETERMINANTS OF HEALTH (SDOH): HOW HARD IS IT FOR YOU TO PAY FOR THE VERY BASICS LIKE FOOD, HOUSING, MEDICAL CARE, AND HEATING?: NOT HARD AT ALL

## 2022-05-20 NOTE — PROGRESS NOTES
Patient:  Gaston Waters  MRN: [id number]  Date of Service: 2022   1926      CHIEF COMPLAINT:    Chief Complaint   Patient presents with    Diabetes     follow up /     Constipation     Tried Miralax which did help with the impaction.  Behavior Problem     Family states that she does not converse anymore, doesn't do any activities and just sleeps.  Medication Problem     D/c trulance x 2 weeks ago due to diarrhea        History Obtained From:  patient, family member - Sister. HISTORY OF PRESENT ILLNESS:   The patient is a 80 y.o. female with prior history of Hypertension,Anxiety,Type 2 Diabetes mellitus,Cognitive difficulties is here for a routine chek up. No chest pain. No shortness of breath. She is not on oxygen at home. No fever and no chills. No night sweats. Appetite is alright. She is home bound and her sister takes good care of her. Past medical, surgical and family history reviewed and updated. Medications, allergies, and social history reviewed and updated. ROS:  Negative. Physical Exam:      General appearance: alert, appears stated age and cooperative  Vitals:   Vitals:    22 1406   BP: 132/68   Pulse: 76   Resp: 16   Temp: 97.8 °F (36.6 °C)   TempSrc: Temporal   SpO2: 96%   Height: 5' 3\" (1.6 m)     Weight:   Wt Readings from Last 5 Encounters:   22 123 lb (55.8 kg)   22 123 lb (55.8 kg)   21 123 lb 6.4 oz (56 kg)   21 110 lb (49.9 kg)   20 120 lb 1 oz (54.5 kg)      Skin: Skin color, texture, turgor normal. No rashes or lesions. HEENT: Head: Normocephalic, no lesions, without obvious abnormality. Head: Normal, normocephalic, atraumatic. Eye: Normal external eye, conjunctiva, lids cornea, ROLAND. Ears: Normal TM's bilaterally. Normal auditory canals and external ears. Non-tender. Nose: Normal external nose, mucus membranes and septum. Neck / Thyroid: Supple, no masses, nodes, nodules or enlargement.   Neck: no adenopathy, no carotid bruit, no JVD, supple, symmetrical, trachea midline and thyroid not enlarged, symmetric, no tenderness/mass/nodules  Lungs: clear to auscultation bilaterally  Heart: regular rate and rhythm, S1, S2 normal, no murmur, click, rub or gallop  Abdomen: soft, non-tender; bowel sounds normal; no masses,  no organomegaly  Extremities: extremities normal, atraumatic, no cyanosis or edema  Neurologic: Mental status: Alert, awke nd does not speak at all. Moves her arms and legs on verbal commnd and is matteo wheel chair.     Labs:  CBC:   Lab Results   Component Value Date    WBC 22.0 03/11/2022    RBC 4.27 03/11/2022    HGB 12.5 03/11/2022    HCT 38.1 03/11/2022    MCV 89.2 03/11/2022    MCH 29.3 03/11/2022    MCHC 32.8 03/11/2022    RDW 13.4 03/11/2022     03/11/2022    MPV 10.8 03/11/2022     WBC:    Lab Results   Component Value Date    WBC 22.0 03/11/2022     Platelets:    Lab Results   Component Value Date     03/11/2022     CMP:    Lab Results   Component Value Date     03/11/2022    K 4.9 03/11/2022    K 4.3 02/17/2022    CL 95 03/11/2022    CO2 22 03/11/2022    BUN 16 03/11/2022    CREATININE 0.8 03/11/2022    GFRAA >60 03/11/2022    LABGLOM >60 03/11/2022    GLUCOSE 263 03/11/2022    PROT 7.5 03/11/2022    LABALBU 4.0 03/11/2022    CALCIUM 10.8 03/11/2022    BILITOT 0.5 03/11/2022    ALKPHOS 51 03/11/2022    AST 16 03/11/2022    ALT 16 03/11/2022     Sodium:    Lab Results   Component Value Date     03/11/2022     Potassium:    Lab Results   Component Value Date    K 4.9 03/11/2022    K 4.3 02/17/2022     BUN/Creatinine:    Lab Results   Component Value Date    BUN 16 03/11/2022    CREATININE 0.8 03/11/2022     Hepatic Function Panel:    Lab Results   Component Value Date    ALKPHOS 51 03/11/2022    ALT 16 03/11/2022    AST 16 03/11/2022    PROT 7.5 03/11/2022    BILITOT 0.5 03/11/2022    LABALBU 4.0 03/11/2022     Albumin:    Lab Results   Component Value Date    LABALBU 4.0 03/11/2022 Calcium:    Lab Results   Component Value Date    CALCIUM 10.8 03/11/2022     Ionized Calcium:  No results found for: IONCA  Uric Acid:  No results found for: Zane Norton   -----------------------------------------------------------------  EKG: Not indicated today. Colonoscopy: There are no preventive care reminders to display for this patient. Mammogram: There are no preventive care reminders to display for this patient. Assessment and Plan   Basil Kingsley was seen today for diabetes, constipation, behavior problem and medication problem. Diagnoses and all orders for this visit:    Type 2 diabetes mellitus without complication, without long-term current use of insulin ([facility])  -     POCT glycosylated hemoglobin (Hb A1C)  -     MICROALBUMIN / CREATININE URINE RATIO; Future  Labs drawn today. Medications reviewed. All questions answered. Hypertension, unspecified type  -     Comprehensive Metabolic Panel; Future  -     LIPID PANEL; Future  Meds reviewed. Hypothyroidism, unspecified type  -     TSH; Future  Stable on low dose Synthroid 25 micrograms po daily. Anxiety  Stable and under care of Dr Feliberto Shepherd. Vitamin D deficiency  -     Vitamin D 25 Hydroxy; Future  Pts sister educated on role of vitamin D supplements. Dizziness of unknown cause  -     CBC with Auto Differential; Future  -     Vitamin B12; Future  Presently very stable. Decrease Lipitor to 20 mgm po very other day  She is no longer on Trulance. Risperdal 0-.5 mgm po daily is prescribed by Dr Feliberto Shepherd. Labs reviewed with patient. Medications reviewed with patient. All questions answered. Return in about 4 weeks (around 6/17/2022).      Elia Cyr MD  3:20 PM  5/20/2022

## 2022-06-27 DIAGNOSIS — E11.9 TYPE 2 DIABETES MELLITUS WITHOUT COMPLICATION, WITHOUT LONG-TERM CURRENT USE OF INSULIN (HCC): ICD-10-CM

## 2022-06-27 RX ORDER — GLIMEPIRIDE 1 MG/1
1 TABLET ORAL EVERY MORNING
Qty: 30 TABLET | Refills: 5 | Status: SHIPPED | OUTPATIENT
Start: 2022-06-27

## 2022-07-18 RX ORDER — AMLODIPINE BESYLATE 10 MG/1
TABLET ORAL
Qty: 120 TABLET | Refills: 0 | Status: SHIPPED | OUTPATIENT
Start: 2022-07-18

## 2022-07-25 DIAGNOSIS — E11.9 TYPE 2 DIABETES MELLITUS WITHOUT COMPLICATION, WITHOUT LONG-TERM CURRENT USE OF INSULIN (HCC): ICD-10-CM

## 2022-08-01 RX ORDER — BLOOD-GLUCOSE METER
KIT MISCELLANEOUS
Qty: 100 STRIP | Refills: 5 | Status: SHIPPED | OUTPATIENT
Start: 2022-08-01

## 2022-09-21 RX ORDER — ATORVASTATIN CALCIUM 20 MG/1
10 TABLET, FILM COATED ORAL DAILY
Qty: 30 TABLET | Refills: 2 | Status: SHIPPED
Start: 2022-09-21 | End: 2022-11-08 | Stop reason: SDUPTHER

## 2022-10-03 RX ORDER — SITAGLIPTIN 100 MG/1
TABLET, FILM COATED ORAL
Qty: 30 TABLET | Refills: 3 | Status: SHIPPED | OUTPATIENT
Start: 2022-10-03

## 2022-10-07 RX ORDER — LANCETS 30 GAUGE
1 EACH MISCELLANEOUS DAILY
Qty: 100 EACH | Refills: 5 | Status: SHIPPED | OUTPATIENT
Start: 2022-10-07

## 2022-10-12 DIAGNOSIS — F32.5 MAJOR DEPRESSIVE DISORDER IN REMISSION, UNSPECIFIED WHETHER RECURRENT (HCC): Primary | ICD-10-CM

## 2022-10-28 ENCOUNTER — TELEPHONE (OUTPATIENT)
Dept: FAMILY MEDICINE CLINIC | Age: 87
End: 2022-10-28

## 2022-10-28 NOTE — TELEPHONE ENCOUNTER
Regla Avelar called to ask if you could put in an order to restart a home health nurse to come and visit purnima and also she would like to restart therapy for her. All through [facility].  Thank you

## 2022-10-31 DIAGNOSIS — I10 HYPERTENSION, UNSPECIFIED TYPE: Primary | ICD-10-CM

## 2022-10-31 DIAGNOSIS — E11.9 CONTROLLED TYPE 2 DIABETES MELLITUS WITHOUT COMPLICATION, WITHOUT LONG-TERM CURRENT USE OF INSULIN (HCC): ICD-10-CM

## 2022-10-31 DIAGNOSIS — E03.9 HYPOTHYROIDISM, UNSPECIFIED TYPE: ICD-10-CM

## 2022-10-31 DIAGNOSIS — J96.01 ACUTE RESPIRATORY FAILURE WITH HYPOXIA (HCC): ICD-10-CM

## 2022-11-08 RX ORDER — ATORVASTATIN CALCIUM 20 MG/1
10 TABLET, FILM COATED ORAL DAILY
Qty: 90 TABLET | Refills: 0 | Status: SHIPPED | OUTPATIENT
Start: 2022-11-08 | End: 2022-12-08

## 2022-11-14 DIAGNOSIS — E11.9 TYPE 2 DIABETES MELLITUS WITHOUT COMPLICATION, WITHOUT LONG-TERM CURRENT USE OF INSULIN (HCC): ICD-10-CM

## 2022-11-14 RX ORDER — GLIMEPIRIDE 1 MG/1
1 TABLET ORAL EVERY MORNING
Qty: 30 TABLET | Refills: 5 | Status: CANCELLED | OUTPATIENT
Start: 2022-11-14

## 2022-11-14 RX ORDER — AMLODIPINE BESYLATE 10 MG/1
TABLET ORAL
Qty: 90 TABLET | Refills: 0 | Status: SHIPPED | OUTPATIENT
Start: 2022-11-14

## 2022-11-21 ENCOUNTER — TELEPHONE (OUTPATIENT)
Dept: FAMILY MEDICINE CLINIC | Age: 87
End: 2022-11-21

## 2022-11-21 RX ORDER — LEVOTHYROXINE SODIUM 0.03 MG/1
TABLET ORAL
Qty: 90 TABLET | Refills: 3 | Status: SHIPPED | OUTPATIENT
Start: 2022-11-21

## 2023-01-03 ENCOUNTER — HOSPITAL ENCOUNTER (EMERGENCY)
Age: 88
Discharge: HOME | End: 2023-01-03
Attending: EMERGENCY MEDICINE
Payer: MEDICARE

## 2023-01-03 VITALS
HEART RATE: 85 BPM | OXYGEN SATURATION: 98 % | SYSTOLIC BLOOD PRESSURE: 154 MMHG | BODY MASS INDEX: 21.79 KG/M2 | RESPIRATION RATE: 16 BRPM | DIASTOLIC BLOOD PRESSURE: 72 MMHG | WEIGHT: 123 LBS | TEMPERATURE: 98.2 F

## 2023-01-03 DIAGNOSIS — L03.90 CELLULITIS, UNSPECIFIED CELLULITIS SITE: Primary | ICD-10-CM

## 2023-01-03 LAB
BACTERIA: NORMAL /HPF
BASOPHILS ABSOLUTE: 0.09 E9/L (ref 0–0.2)
BASOPHILS RELATIVE PERCENT: 0.6 % (ref 0–2)
BILIRUBIN URINE: NEGATIVE
BLOOD, URINE: NEGATIVE
CLARITY: CLEAR
COLOR: YELLOW
EOSINOPHILS ABSOLUTE: 0.13 E9/L (ref 0.05–0.5)
EOSINOPHILS RELATIVE PERCENT: 0.8 % (ref 0–6)
EPITHELIAL CELLS, UA: NORMAL /HPF
GLUCOSE URINE: NEGATIVE MG/DL
HCT VFR BLD CALC: 40.2 % (ref 34–48)
HEMOGLOBIN: 13.5 G/DL (ref 11.5–15.5)
IMMATURE GRANULOCYTES #: 0.13 E9/L
IMMATURE GRANULOCYTES %: 0.8 % (ref 0–5)
KETONES, URINE: NEGATIVE MG/DL
LEUKOCYTE ESTERASE, URINE: NEGATIVE
LYMPHOCYTES ABSOLUTE: 2.59 E9/L (ref 1.5–4)
LYMPHOCYTES RELATIVE PERCENT: 16.7 % (ref 20–42)
MCH RBC QN AUTO: 28.8 PG (ref 26–35)
MCHC RBC AUTO-ENTMCNC: 33.6 % (ref 32–34.5)
MCV RBC AUTO: 85.9 FL (ref 80–99.9)
MONOCYTES ABSOLUTE: 1.4 E9/L (ref 0.1–0.95)
MONOCYTES RELATIVE PERCENT: 9 % (ref 2–12)
NEUTROPHILS ABSOLUTE: 11.18 E9/L (ref 1.8–7.3)
NEUTROPHILS RELATIVE PERCENT: 72.1 % (ref 43–80)
NITRITE, URINE: NEGATIVE
PDW BLD-RTO: 13.1 FL (ref 11.5–15)
PH UA: 5.5 (ref 5–9)
PLATELET # BLD: 253 E9/L (ref 130–450)
PMV BLD AUTO: 10.5 FL (ref 7–12)
PROTEIN UA: NEGATIVE MG/DL
RBC # BLD: 4.68 E12/L (ref 3.5–5.5)
RBC UA: NORMAL /HPF (ref 0–2)
SPECIFIC GRAVITY UA: 1.02 (ref 1–1.03)
UROBILINOGEN, URINE: 0.2 E.U./DL
WBC # BLD: 15.5 E9/L (ref 4.5–11.5)
WBC UA: NORMAL /HPF (ref 0–5)

## 2023-01-03 PROCEDURE — 81001 URINALYSIS AUTO W/SCOPE: CPT

## 2023-01-03 PROCEDURE — 85025 COMPLETE CBC W/AUTO DIFF WBC: CPT

## 2023-01-03 PROCEDURE — 99283 EMERGENCY DEPT VISIT LOW MDM: CPT

## 2023-01-03 PROCEDURE — 87088 URINE BACTERIA CULTURE: CPT

## 2023-01-03 RX ORDER — DOXYCYCLINE HYCLATE 100 MG
100 TABLET ORAL 2 TIMES DAILY
Qty: 20 TABLET | Refills: 0 | Status: SHIPPED | OUTPATIENT
Start: 2023-01-03 | End: 2023-01-13

## 2023-01-03 ASSESSMENT — PAIN - FUNCTIONAL ASSESSMENT: PAIN_FUNCTIONAL_ASSESSMENT: NONE - DENIES PAIN

## 2023-01-03 NOTE — ED PROVIDER NOTES
89 y/oFemale patient with history of dementia in alter metal tattoo [address] home for a complains of Rubens Love bleeding she is unsure if it's coming from anus or the vaginal area. She reports no acute complaints, and says her family would like her to be evaluate. The bleeding was noted to start today. No aggravated or leave any symptoms noted. No reports of Caterina pain or urinary symptoms. No reports of chest pain. No reports of shortness of breath. No reports of headaches. Chief Complaint   Patient presents with    Rectal Bleeding     Unsure if bleeding is coming from rectum or vagina, family would like pt evaluated for UTI       Review of Systems   Reason unable to perform ROS: H/O dementia. Physical Exam  Constitutional:       Appearance: Normal appearance. HENT:      Head: Normocephalic and atraumatic. Cardiovascular:      Pulses: Normal pulses. Heart sounds: Normal heart sounds. Pulmonary:      Effort: Pulmonary effort is normal. No respiratory distress. Abdominal:      Palpations: Abdomen is soft. Tenderness: There is no abdominal tenderness. Genitourinary:     Comments: external genitalia is macerated. There is an area of cellulitis/abscess to posterior right majora that appears to have drained. Guiac is negative, stool is normal consistence and brown  Skin:     General: Skin is warm. Capillary Refill: Capillary refill takes less than 2 seconds. Neurological:      Mental Status: She is alert. Mental status is at baseline. Procedures         MDM       80year-old female patient with history of dementia and alter mental status presents in emergency department for the concerns of rectal bleeding. She is unreliable historian due to her dementia. Her family is at bedside and would like her to be evaluated for bleeding that could possibly be coming from the rectal or the vaginal area. Upon entering the room she is in no acute distress and hemodynamicall stable. Abdomen is soft and nontender. She was examined, FOBT -, no active bleeding brown stool per rectum. There is cellulitis in majora area that hs alread started to drain. Please see ED course below. Impression is cellulits/abcess and patients is given Doxy for home meds with follow up with PCP. Patient and family agreed with plan. ED Course as of 01/04/23 2204 Tue Jan 03, 2023 1532 I reviewed with the resident/PA/NP the medical history and the  findings on the physical examination. I discussed the patient's diagnosis and concur with the plan. HPI:  80 y.o. y/o female c/o blood in depends. Physical exam:   HEENT: head is normocephalic atraumatic  Respiratory exam: No respiratory distress  Pelvic exam: external genitalia is macerated. There is an area of cellulitis/abscess to posterior right majora that appears to have drained. Guiac is negative, stool is normal consistence and brown. Emergency department course: Patient was seen and examined. [CC]      ED Course User Index  [CC] Glen Perez MD        ED Course as of 01/04/23 2204 Tue Jan 03, 2023 1532 I reviewed with the resident/PA/NP the medical history and the  findings on the physical examination. I discussed the patient's diagnosis and concur with the plan. HPI:  80 y.o. y/o female c/o blood in depends. Physical exam:   HEENT: head is normocephalic atraumatic  Respiratory exam: No respiratory distress  Pelvic exam: external genitalia is macerated. There is an area of cellulitis/abscess to posterior right majora that appears to have drained. Guiac is negative, stool is normal consistence and brown. Emergency department course: Patient was seen and examined.         [CC]      ED Course User Index  [CC] Glen Perez MD       --------------------------------------------- PAST HISTORY ---------------------------------------------  Past Medical History:  has a past medical history of Diabetes mellitus ([facility] [address] Hyperlipidemia, Hypertension, and Thyroid disease. Past Surgical History:  has a past surgical history that includes Thyroidectomy and Dilation and curettage of uterus. Social History:  reports that she has never smoked. She has never used smokeless tobacco. She reports that she does not drink alcohol and does not use drugs. Family History: family history is not on file. The patients home medications have been reviewed. Allergies: Patient has no known allergies.     -------------------------------------------------- RESULTS -------------------------------------------------  Labs:  Results for orders placed or performed during the hospital encounter of 01/03/23   Culture, Urine    Specimen: Urine, clean catch   Result Value Ref Range    Urine Culture, Routine Growth not present, incubation continues    Urinalysis with Microscopic   Result Value Ref Range    Color, UA Yellow Straw/Yellow    Clarity, UA Clear Clear    Glucose, Ur Negative Negative mg/dL    Bilirubin Urine Negative Negative    Ketones, Urine Negative Negative mg/dL    Specific Gravity, UA 1.020 1.005 - 1.030    Blood, Urine Negative Negative    pH, UA 5.5 5.0 - 9.0    Protein, UA Negative Negative mg/dL    Urobilinogen, Urine 0.2 <2.0 E.U./dL    Nitrite, Urine Negative Negative    Leukocyte Esterase, Urine Negative Negative    WBC, UA 0-1 0 - 5 /HPF    RBC, UA 0-1 0 - 2 /HPF    Epithelial Cells, UA RARE /HPF    Bacteria, UA NONE SEEN None Seen /HPF   CBC with Auto Differential   Result Value Ref Range    WBC 15.5 (H) 4.5 - 11.5 E9/L    RBC 4.68 3.50 - 5.50 E12/L    Hemoglobin 13.5 11.5 - 15.5 g/dL    Hematocrit 40.2 34.0 - 48.0 %    MCV 85.9 80.0 - 99.9 fL    MCH 28.8 26.0 - 35.0 pg    MCHC 33.6 32.0 - 34.5 %    RDW 13.1 11.5 - 15.0 fL    Platelets 646 131 - 869 E9/L    MPV 10.5 7.0 - 12.0 fL    Neutrophils % 72.1 43.0 - 80.0 %    Immature Granulocytes % 0.8 0.0 - 5.0 %    Lymphocytes % 16.7 (L) 20.0 - 42.0 %    Monocytes % 9.0 2.0 - 12.0 % Eosinophils % 0.8 0.0 - 6.0 %    Basophils % 0.6 0.0 - 2.0 %    Neutrophils Absolute 11.18 (H) 1.80 - 7.30 E9/L    Immature Granulocytes # 0.13 E9/L    Lymphocytes Absolute 2.59 1.50 - 4.00 E9/L    Monocytes Absolute 1.40 (H) 0.10 - 0.95 E9/L    Eosinophils Absolute 0.13 0.05 - 0.50 E9/L    Basophils Absolute 0.09 0.00 - 0.20 E9/L       Radiology:  No orders to display       ------------------------- NURSING NOTES AND VITALS REVIEWED ---------------------------  Date / Time Roomed:  1/3/2023  2:56 PM  ED Bed Assignment:  CORTES Rocío    The nursing notes within the ED encounter and vital signs as below have been reviewed. BP (!) 154/72   Pulse 85   Temp 98.2 °F (36.8 °C)   Resp 16   Wt 123 lb (55.8 kg)   LMP 08/01/2015   SpO2 98%   BMI 21.79 kg/m²   Oxygen Saturation Interpretation: Normal      ------------------------------------------ PROGRESS NOTES ------------------------------------------  I have spoken with the patient and discussed todays results, in addition to providing specific details for the plan of care and counseling regarding the diagnosis and prognosis. Their questions are answered at this time and they are agreeable with the plan. I discussed at length with them reasons for immediate return here for re evaluation. They will followup with primary care by calling their office tomorrow. --------------------------------- ADDITIONAL PROVIDER NOTES ---------------------------------  At this time the patient is without objective evidence of an acute process requiring hospitalization or inpatient management. They have remained hemodynamically stable throughout their entire ED visit and are stable for discharge with outpatient follow-up. The plan has been discussed in detail and they are aware of the specific conditions for emergent return, as well as the importance of follow-up.       Discharge Medication List as of 1/3/2023  4:28 PM        START taking these medications    Details doxycycline hyclate (VIBRA-TABS) 100 MG tablet Take 1 tablet by mouth 2 times daily for 10 days, Disp-20 tablet, R-0Normal             Diagnosis:  1. Cellulitis, unspecified cellulitis site        Disposition:  Patient's disposition: Discharge to home  Patient's condition is stable. Attending was present and available throughout encounter including all critical portions;  See Attending Note/Attestation for Final Plan       Paula Goldmann, DO  Resident  01/04/23 3097

## 2023-01-05 LAB — URINE CULTURE, ROUTINE: NORMAL

## 2023-01-12 DIAGNOSIS — E11.9 TYPE 2 DIABETES MELLITUS WITHOUT COMPLICATION, WITHOUT LONG-TERM CURRENT USE OF INSULIN (HCC): ICD-10-CM

## 2023-01-13 RX ORDER — GLIMEPIRIDE 1 MG/1
1 TABLET ORAL EVERY MORNING
Qty: 30 TABLET | Refills: 5 | Status: SHIPPED | OUTPATIENT
Start: 2023-01-13

## 2023-01-19 ENCOUNTER — TELEPHONE (OUTPATIENT)
Dept: FAMILY MEDICINE CLINIC | Age: 88
End: 2023-01-19

## 2023-01-19 DIAGNOSIS — I10 HYPERTENSION, UNSPECIFIED TYPE: ICD-10-CM

## 2023-01-19 DIAGNOSIS — E78.5 HYPERLIPIDEMIA, UNSPECIFIED HYPERLIPIDEMIA TYPE: ICD-10-CM

## 2023-01-19 DIAGNOSIS — E03.9 HYPOTHYROIDISM, UNSPECIFIED TYPE: ICD-10-CM

## 2023-01-19 DIAGNOSIS — E11.9 TYPE 2 DIABETES MELLITUS WITHOUT COMPLICATION, WITHOUT LONG-TERM CURRENT USE OF INSULIN (HCC): Primary | ICD-10-CM

## 2023-01-19 DIAGNOSIS — E55.9 VITAMIN D DEFICIENCY: ICD-10-CM

## 2023-01-19 NOTE — TELEPHONE ENCOUNTER
Phone call from patient's sister. She wants to know if she can get an order sent to [place] so they can go to patient's home to draw her labs.

## 2023-01-20 ENCOUNTER — TELEPHONE (OUTPATIENT)
Dept: FAMILY MEDICINE CLINIC | Age: 88
End: 2023-01-20

## 2023-01-20 NOTE — TELEPHONE ENCOUNTER
Spoke with Jl Bolanos and gave her the message. She wanted you to know that she has been trying to get patient in to see Pat Cheatham since November. They have her on a waiting list. But, patient does have an appointment on 1/25/23 with a new psychiatrist, Wanda Gutierrez.

## 2023-01-20 NOTE — TELEPHONE ENCOUNTER
Patient needs a refill on Risperidone 0.5mg. Richi Cummings, her caretaker, says she takes it 2 times a day. Her doctor that was prescribing it is retiring. Per Glen Diaz, he is going to refer her to a new psychiatrist Shauna Villatoro who will get her in to be seen as soon as possible so that she can prescribe that medication to her.

## 2023-01-24 DIAGNOSIS — E11.9 CONTROLLED TYPE 2 DIABETES MELLITUS WITHOUT COMPLICATION, WITHOUT LONG-TERM CURRENT USE OF INSULIN (HCC): ICD-10-CM

## 2023-01-24 DIAGNOSIS — I10 HYPERTENSION, UNSPECIFIED TYPE: Primary | ICD-10-CM

## 2023-01-24 DIAGNOSIS — J96.01 ACUTE RESPIRATORY FAILURE WITH HYPOXIA (HCC): ICD-10-CM

## 2023-01-24 DIAGNOSIS — F41.9 ANXIETY: ICD-10-CM

## 2023-01-24 DIAGNOSIS — E03.9 HYPOTHYROIDISM, UNSPECIFIED TYPE: ICD-10-CM

## 2023-01-25 RX ORDER — SITAGLIPTIN 100 MG/1
TABLET, FILM COATED ORAL
Qty: 30 TABLET | Refills: 3 | Status: SHIPPED | OUTPATIENT
Start: 2023-01-25

## 2023-01-25 NOTE — TELEPHONE ENCOUNTER
Last Appointment:  5/20/2022  Future Appointments   Date Time Provider Izabela Vance   2/13/2023  3:00 PM Michaela Landers  [address]

## 2023-02-09 ENCOUNTER — TELEPHONE (OUTPATIENT)
Dept: FAMILY MEDICINE CLINIC | Age: 88
End: 2023-02-09

## 2023-02-09 RX ORDER — AMLODIPINE BESYLATE 10 MG/1
TABLET ORAL
Qty: 90 TABLET | Refills: 0 | Status: SHIPPED | OUTPATIENT
Start: 2023-02-09

## 2023-02-09 NOTE — TELEPHONE ENCOUNTER
[facility] called stating [facility] are unable to do home care currently due to not a covered dx. Needing more information for dx. Please advise.

## 2023-02-09 NOTE — TELEPHONE ENCOUNTER
Last Appointment:  Visit date not found  Future Appointments   Date Time Provider Izabela Vance   2/13/2023  3:00 PM Johan Mcmahan  [address]

## 2023-02-13 NOTE — TELEPHONE ENCOUNTER
Unable to accommodate due to pt needing face to face within last 60 days and pt and caregiver have been notified by [facility]

## 2023-02-14 RX ORDER — AMLODIPINE BESYLATE 10 MG/1
TABLET ORAL
Qty: 90 TABLET | Refills: 0 | Status: CANCELLED | OUTPATIENT
Start: 2023-02-14

## 2023-03-20 DIAGNOSIS — E11.9 TYPE 2 DIABETES MELLITUS WITHOUT COMPLICATION, WITHOUT LONG-TERM CURRENT USE OF INSULIN (HCC): ICD-10-CM

## 2023-03-20 RX ORDER — GLIMEPIRIDE 1 MG/1
1 TABLET ORAL EVERY MORNING
Qty: 30 TABLET | Refills: 5 | Status: SHIPPED | OUTPATIENT
Start: 2023-03-20

## 2023-03-20 NOTE — TELEPHONE ENCOUNTER
Last Appointment:  5/20/2022  No future appointments. Patient is requesting a 90 day script instead of 30 day script.

## 2023-03-21 LAB
ALBUMIN SERPL-MCNC: 4.6 G/DL
ALP BLD-CCNC: 48 U/L
ALT SERPL-CCNC: 25 U/L
ANION GAP SERPL CALCULATED.3IONS-SCNC: 1.9 MMOL/L
AST SERPL-CCNC: 33 U/L
AVERAGE GLUCOSE: NORMAL
BASOPHILS ABSOLUTE: 0.06 /ΜL
BASOPHILS RELATIVE PERCENT: 0.4 %
BILIRUB SERPL-MCNC: 0.6 MG/DL (ref 0.1–1.4)
BUN BLDV-MCNC: 17 MG/DL
CALCIUM SERPL-MCNC: 10.6 MG/DL
CHLORIDE BLD-SCNC: 107 MMOL/L
CHOLESTEROL, TOTAL: 172 MG/DL
CHOLESTEROL/HDL RATIO: NORMAL
CO2: 21 MMOL/L
CREAT SERPL-MCNC: 0.8 MG/DL
CREATININE, URINE: 66.4
EGFR: 62.4
EOSINOPHILS ABSOLUTE: 0.11 /ΜL
EOSINOPHILS RELATIVE PERCENT: 0.8 %
GLUCOSE BLD-MCNC: 163 MG/DL
HBA1C MFR BLD: 7.1 %
HCT VFR BLD CALC: 44.1 % (ref 36–46)
HDLC SERPL-MCNC: 51 MG/DL (ref 35–70)
HEMOGLOBIN: 14.2 G/DL (ref 12–16)
LDL CHOLESTEROL CALCULATED: 60 MG/DL (ref 0–160)
LYMPHOCYTES ABSOLUTE: 2.36 /ΜL
LYMPHOCYTES RELATIVE PERCENT: 17.1 %
MCH RBC QN AUTO: 29.1 PG
MCHC RBC AUTO-ENTMCNC: 32.1 G/DL
MCV RBC AUTO: 90.5 FL
MICROALBUMIN/CREAT 24H UR: 1.8 MG/G{CREAT}
MICROALBUMIN/CREAT UR-RTO: 27
MONOCYTES ABSOLUTE: 0.58 /ΜL
MONOCYTES RELATIVE PERCENT: 4.2 %
NEUTROPHILS ABSOLUTE: 10.57 /ΜL
NEUTROPHILS RELATIVE PERCENT: 76.3 %
NONHDLC SERPL-MCNC: NORMAL MG/DL
PDW BLD-RTO: 13.8 %
PLATELET # BLD: 145 K/ΜL
PMV BLD AUTO: 9.8 FL
POTASSIUM SERPL-SCNC: 5.1 MMOL/L
RBC # BLD: 4.87 10^6/ΜL
SODIUM BLD-SCNC: 141 MMOL/L
TOTAL PROTEIN: 7
TRIGL SERPL-MCNC: 305 MG/DL
TSH SERPL DL<=0.05 MIU/L-ACNC: 2.24 UIU/ML
VITAMIN D 25-HYDROXY: 13
VITAMIN D2, 25 HYDROXY: NORMAL
VITAMIN D3,25 HYDROXY: NORMAL
VLDLC SERPL CALC-MCNC: 61 MG/DL
WBC # BLD: 13.85 10^3/ML

## 2023-04-26 DIAGNOSIS — E78.5 HYPERLIPIDEMIA, UNSPECIFIED HYPERLIPIDEMIA TYPE: ICD-10-CM

## 2023-04-26 DIAGNOSIS — E55.9 VITAMIN D DEFICIENCY: ICD-10-CM

## 2023-04-26 DIAGNOSIS — E11.9 TYPE 2 DIABETES MELLITUS WITHOUT COMPLICATION, WITHOUT LONG-TERM CURRENT USE OF INSULIN (HCC): ICD-10-CM

## 2023-04-26 DIAGNOSIS — I10 HYPERTENSION, UNSPECIFIED TYPE: ICD-10-CM

## 2023-04-26 DIAGNOSIS — E03.9 HYPOTHYROIDISM, UNSPECIFIED TYPE: ICD-10-CM

## 2023-05-04 ENCOUNTER — TELEPHONE (OUTPATIENT)
Dept: FAMILY MEDICINE CLINIC | Age: 88
End: 2023-05-04

## 2023-05-04 DIAGNOSIS — E55.9 VITAMIN D DEFICIENCY: Primary | ICD-10-CM

## 2023-05-04 RX ORDER — ERGOCALCIFEROL 1.25 MG/1
50000 CAPSULE ORAL WEEKLY
Qty: 12 CAPSULE | Refills: 1 | Status: SHIPPED | OUTPATIENT
Start: 2023-05-04

## 2023-05-04 NOTE — TELEPHONE ENCOUNTER
Pt is currently taking Vitamin D 2000 units daily and does take a multi vitamin with 25 units daily. With her Vitamin D level being 13 do you want her to increase Vitamin D or will you be sending in script for Vitamin D?   Please advise

## 2023-05-15 RX ORDER — AMLODIPINE BESYLATE 10 MG/1
TABLET ORAL
Qty: 90 TABLET | Refills: 0 | Status: SHIPPED | OUTPATIENT
Start: 2023-05-15

## 2023-05-15 RX ORDER — ATORVASTATIN CALCIUM 20 MG/1
TABLET, FILM COATED ORAL
Qty: 90 TABLET | Refills: 0 | Status: SHIPPED | OUTPATIENT
Start: 2023-05-15

## 2023-05-15 NOTE — TELEPHONE ENCOUNTER
Last Appointment:  5/20/2022  Future Appointments   Date Time Provider Izabela Vance   5/15/2023  3:20 PM Korina Thakkar  [address]

## 2023-05-15 NOTE — TELEPHONE ENCOUNTER
Last Appointment:  Visit date not found  Future Appointments   Date Time Provider Izabela Vance   5/15/2023  3:20 PM Korina Thakkar  [address]

## 2023-05-24 RX ORDER — MEMANTINE HYDROCHLORIDE 5 MG/1
5 TABLET ORAL 2 TIMES DAILY
Qty: 180 TABLET | Refills: 1 | Status: SHIPPED | OUTPATIENT
Start: 2023-05-24